# Patient Record
Sex: MALE | Race: WHITE | Employment: OTHER | URBAN - METROPOLITAN AREA
[De-identification: names, ages, dates, MRNs, and addresses within clinical notes are randomized per-mention and may not be internally consistent; named-entity substitution may affect disease eponyms.]

---

## 2019-10-03 ENCOUNTER — HOSPITAL ENCOUNTER (INPATIENT)
Dept: CARDIAC CATH/INVASIVE PROCEDURES | Age: 64
LOS: 2 days | Discharge: HOME | DRG: 282 | End: 2019-10-05
Attending: INTERNAL MEDICINE | Admitting: INTERNAL MEDICINE
Payer: OTHER GOVERNMENT

## 2019-10-03 ENCOUNTER — HOSPITAL ENCOUNTER (EMERGENCY)
Age: 64
Discharge: ANOTHER HOSPITAL | End: 2019-10-03
Attending: EMERGENCY MEDICINE
Payer: OTHER GOVERNMENT

## 2019-10-03 ENCOUNTER — APPOINTMENT (OUTPATIENT)
Dept: GENERAL RADIOLOGY | Age: 64
End: 2019-10-03
Payer: OTHER GOVERNMENT

## 2019-10-03 VITALS
DIASTOLIC BLOOD PRESSURE: 92 MMHG | BODY MASS INDEX: 29.8 KG/M2 | OXYGEN SATURATION: 96 % | WEIGHT: 220 LBS | HEIGHT: 72 IN | TEMPERATURE: 97.9 F | HEART RATE: 87 BPM | RESPIRATION RATE: 13 BRPM | SYSTOLIC BLOOD PRESSURE: 157 MMHG

## 2019-10-03 DIAGNOSIS — I21.11 ST ELEVATION MYOCARDIAL INFARCTION INVOLVING RIGHT CORONARY ARTERY (HCC): Primary | ICD-10-CM

## 2019-10-03 DIAGNOSIS — I21.3 ST ELEVATION MYOCARDIAL INFARCTION (STEMI), UNSPECIFIED ARTERY (HCC): Primary | ICD-10-CM

## 2019-10-03 DIAGNOSIS — I10 ESSENTIAL HYPERTENSION: ICD-10-CM

## 2019-10-03 DIAGNOSIS — E78.5 HYPERLIPIDEMIA, UNSPECIFIED HYPERLIPIDEMIA TYPE: ICD-10-CM

## 2019-10-03 PROBLEM — E11.9 DM (DIABETES MELLITUS) (HCC): Status: ACTIVE | Noted: 2019-10-03

## 2019-10-03 LAB
A/G RATIO: 1.8 (ref 1.1–2.2)
ALBUMIN SERPL-MCNC: 4.6 G/DL (ref 3.4–5)
ALP BLD-CCNC: 51 U/L (ref 40–129)
ALT SERPL-CCNC: 14 U/L (ref 10–40)
ANION GAP SERPL CALCULATED.3IONS-SCNC: 14 MMOL/L (ref 3–16)
APTT: 29.8 SEC (ref 26–36)
AST SERPL-CCNC: 16 U/L (ref 15–37)
BASOPHILS ABSOLUTE: 0 K/UL (ref 0–0.2)
BASOPHILS RELATIVE PERCENT: 0.5 %
BILIRUB SERPL-MCNC: 1.1 MG/DL (ref 0–1)
BUN BLDV-MCNC: 28 MG/DL (ref 7–20)
CALCIUM SERPL-MCNC: 10 MG/DL (ref 8.3–10.6)
CHLORIDE BLD-SCNC: 100 MMOL/L (ref 99–110)
CO2: 24 MMOL/L (ref 21–32)
CREAT SERPL-MCNC: 1.2 MG/DL (ref 0.8–1.3)
EKG ATRIAL RATE: 67 BPM
EKG ATRIAL RATE: 72 BPM
EKG ATRIAL RATE: 79 BPM
EKG DIAGNOSIS: NORMAL
EKG P AXIS: 38 DEGREES
EKG P AXIS: 44 DEGREES
EKG P AXIS: 50 DEGREES
EKG P-R INTERVAL: 134 MS
EKG P-R INTERVAL: 138 MS
EKG P-R INTERVAL: 144 MS
EKG Q-T INTERVAL: 370 MS
EKG Q-T INTERVAL: 378 MS
EKG Q-T INTERVAL: 390 MS
EKG QRS DURATION: 82 MS
EKG QRS DURATION: 84 MS
EKG QRS DURATION: 86 MS
EKG QTC CALCULATION (BAZETT): 399 MS
EKG QTC CALCULATION (BAZETT): 424 MS
EKG QTC CALCULATION (BAZETT): 427 MS
EKG R AXIS: 74 DEGREES
EKG R AXIS: 82 DEGREES
EKG R AXIS: 90 DEGREES
EKG T AXIS: 40 DEGREES
EKG T AXIS: 72 DEGREES
EKG T AXIS: 73 DEGREES
EKG VENTRICULAR RATE: 67 BPM
EKG VENTRICULAR RATE: 72 BPM
EKG VENTRICULAR RATE: 79 BPM
EOSINOPHILS ABSOLUTE: 0.1 K/UL (ref 0–0.6)
EOSINOPHILS RELATIVE PERCENT: 1.7 %
GFR AFRICAN AMERICAN: >60
GFR NON-AFRICAN AMERICAN: >60
GLOBULIN: 2.6 G/DL
GLUCOSE BLD-MCNC: 144 MG/DL (ref 70–99)
GLUCOSE BLD-MCNC: 202 MG/DL (ref 70–99)
GLUCOSE BLD-MCNC: 206 MG/DL (ref 70–99)
HCT VFR BLD CALC: 40.2 % (ref 40.5–52.5)
HEMOGLOBIN: 13.9 G/DL (ref 13.5–17.5)
LEFT VENTRICULAR EJECTION FRACTION HIGH VALUE: 40 %
LV EF: 40 %
LV EF: 48 %
LVEF MODALITY: NORMAL
LVEF MODALITY: NORMAL
LYMPHOCYTES ABSOLUTE: 2.4 K/UL (ref 1–5.1)
LYMPHOCYTES RELATIVE PERCENT: 33.4 %
MCH RBC QN AUTO: 30.4 PG (ref 26–34)
MCHC RBC AUTO-ENTMCNC: 34.5 G/DL (ref 31–36)
MCV RBC AUTO: 88 FL (ref 80–100)
MONOCYTES ABSOLUTE: 0.6 K/UL (ref 0–1.3)
MONOCYTES RELATIVE PERCENT: 9.1 %
NEUTROPHILS ABSOLUTE: 3.9 K/UL (ref 1.7–7.7)
NEUTROPHILS RELATIVE PERCENT: 55.3 %
PDW BLD-RTO: 13.6 % (ref 12.4–15.4)
PERFORMED ON: ABNORMAL
PERFORMED ON: ABNORMAL
PLATELET # BLD: 180 K/UL (ref 135–450)
PMV BLD AUTO: 6.4 FL (ref 5–10.5)
POC ACT LR: 162 SEC
POC ACT LR: 217 SEC
POTASSIUM REFLEX MAGNESIUM: 4.8 MMOL/L (ref 3.5–5.1)
RBC # BLD: 4.57 M/UL (ref 4.2–5.9)
SODIUM BLD-SCNC: 138 MMOL/L (ref 136–145)
TOTAL PROTEIN: 7.2 G/DL (ref 6.4–8.2)
TROPONIN: 0.51 NG/ML
TROPONIN: <0.01 NG/ML
WBC # BLD: 7.1 K/UL (ref 4–11)

## 2019-10-03 PROCEDURE — C1769 GUIDE WIRE: HCPCS

## 2019-10-03 PROCEDURE — 85347 COAGULATION TIME ACTIVATED: CPT

## 2019-10-03 PROCEDURE — B2111ZZ FLUOROSCOPY OF MULTIPLE CORONARY ARTERIES USING LOW OSMOLAR CONTRAST: ICD-10-PCS | Performed by: INTERNAL MEDICINE

## 2019-10-03 PROCEDURE — 93005 ELECTROCARDIOGRAM TRACING: CPT | Performed by: PHYSICIAN ASSISTANT

## 2019-10-03 PROCEDURE — 85025 COMPLETE CBC W/AUTO DIFF WBC: CPT

## 2019-10-03 PROCEDURE — 96374 THER/PROPH/DIAG INJ IV PUSH: CPT

## 2019-10-03 PROCEDURE — 2500000003 HC RX 250 WO HCPCS

## 2019-10-03 PROCEDURE — 93458 L HRT ARTERY/VENTRICLE ANGIO: CPT

## 2019-10-03 PROCEDURE — 84484 ASSAY OF TROPONIN QUANT: CPT

## 2019-10-03 PROCEDURE — 96375 TX/PRO/DX INJ NEW DRUG ADDON: CPT

## 2019-10-03 PROCEDURE — 6360000002 HC RX W HCPCS: Performed by: PHYSICIAN ASSISTANT

## 2019-10-03 PROCEDURE — 93005 ELECTROCARDIOGRAM TRACING: CPT | Performed by: INTERNAL MEDICINE

## 2019-10-03 PROCEDURE — 2000000000 HC ICU R&B

## 2019-10-03 PROCEDURE — 99291 CRITICAL CARE FIRST HOUR: CPT

## 2019-10-03 PROCEDURE — 4A023N7 MEASUREMENT OF CARDIAC SAMPLING AND PRESSURE, LEFT HEART, PERCUTANEOUS APPROACH: ICD-10-PCS | Performed by: INTERNAL MEDICINE

## 2019-10-03 PROCEDURE — 80053 COMPREHEN METABOLIC PANEL: CPT

## 2019-10-03 PROCEDURE — 6370000000 HC RX 637 (ALT 250 FOR IP): Performed by: INTERNAL MEDICINE

## 2019-10-03 PROCEDURE — 6360000004 HC RX CONTRAST MEDICATION

## 2019-10-03 PROCEDURE — C1887 CATHETER, GUIDING: HCPCS

## 2019-10-03 PROCEDURE — 2709999900 HC NON-CHARGEABLE SUPPLY

## 2019-10-03 PROCEDURE — 85730 THROMBOPLASTIN TIME PARTIAL: CPT

## 2019-10-03 PROCEDURE — 6360000002 HC RX W HCPCS

## 2019-10-03 PROCEDURE — 36415 COLL VENOUS BLD VENIPUNCTURE: CPT

## 2019-10-03 PROCEDURE — 6360000002 HC RX W HCPCS: Performed by: EMERGENCY MEDICINE

## 2019-10-03 PROCEDURE — 6370000000 HC RX 637 (ALT 250 FOR IP)

## 2019-10-03 PROCEDURE — 71045 X-RAY EXAM CHEST 1 VIEW: CPT

## 2019-10-03 PROCEDURE — 99291 CRITICAL CARE FIRST HOUR: CPT | Performed by: INTERNAL MEDICINE

## 2019-10-03 PROCEDURE — B2151ZZ FLUOROSCOPY OF LEFT HEART USING LOW OSMOLAR CONTRAST: ICD-10-PCS | Performed by: INTERNAL MEDICINE

## 2019-10-03 PROCEDURE — 93306 TTE W/DOPPLER COMPLETE: CPT

## 2019-10-03 PROCEDURE — 2580000003 HC RX 258: Performed by: INTERNAL MEDICINE

## 2019-10-03 PROCEDURE — 6370000000 HC RX 637 (ALT 250 FOR IP): Performed by: EMERGENCY MEDICINE

## 2019-10-03 PROCEDURE — C1894 INTRO/SHEATH, NON-LASER: HCPCS

## 2019-10-03 RX ORDER — LISINOPRIL AND HYDROCHLOROTHIAZIDE 20; 12.5 MG/1; MG/1
1 TABLET ORAL DAILY
Status: ON HOLD | COMMUNITY
End: 2019-10-05 | Stop reason: HOSPADM

## 2019-10-03 RX ORDER — LISINOPRIL 10 MG/1
5 TABLET ORAL DAILY
Status: DISCONTINUED | OUTPATIENT
Start: 2019-10-03 | End: 2019-10-05 | Stop reason: HOSPADM

## 2019-10-03 RX ORDER — DEXTROSE MONOHYDRATE 25 G/50ML
12.5 INJECTION, SOLUTION INTRAVENOUS PRN
Status: DISCONTINUED | OUTPATIENT
Start: 2019-10-03 | End: 2019-10-05 | Stop reason: HOSPADM

## 2019-10-03 RX ORDER — ASPIRIN 81 MG/1
81 TABLET, CHEWABLE ORAL DAILY
Status: DISCONTINUED | OUTPATIENT
Start: 2019-10-04 | End: 2019-10-05 | Stop reason: HOSPADM

## 2019-10-03 RX ORDER — GLIPIZIDE 10 MG/1
5 TABLET ORAL
COMMUNITY

## 2019-10-03 RX ORDER — ASPIRIN 81 MG/1
81 TABLET, CHEWABLE ORAL DAILY
Status: DISCONTINUED | OUTPATIENT
Start: 2019-10-03 | End: 2019-10-03

## 2019-10-03 RX ORDER — ACETAMINOPHEN 325 MG/1
650 TABLET ORAL EVERY 4 HOURS PRN
Status: DISCONTINUED | OUTPATIENT
Start: 2019-10-03 | End: 2019-10-05 | Stop reason: HOSPADM

## 2019-10-03 RX ORDER — HEPARIN SODIUM 1000 [USP'U]/ML
4000 INJECTION, SOLUTION INTRAVENOUS; SUBCUTANEOUS PRN
Status: DISCONTINUED | OUTPATIENT
Start: 2019-10-03 | End: 2019-10-03 | Stop reason: HOSPADM

## 2019-10-03 RX ORDER — CARVEDILOL 6.25 MG/1
6.25 TABLET ORAL 2 TIMES DAILY WITH MEALS
Status: DISCONTINUED | OUTPATIENT
Start: 2019-10-03 | End: 2019-10-05 | Stop reason: HOSPADM

## 2019-10-03 RX ORDER — HEPARIN SODIUM 1000 [USP'U]/ML
INJECTION, SOLUTION INTRAVENOUS; SUBCUTANEOUS
Status: COMPLETED | OUTPATIENT
Start: 2019-10-03 | End: 2019-10-03

## 2019-10-03 RX ORDER — SIMVASTATIN 40 MG
40 TABLET ORAL NIGHTLY
Status: ON HOLD | COMMUNITY
End: 2019-10-05 | Stop reason: HOSPADM

## 2019-10-03 RX ORDER — SPIRONOLACTONE 25 MG/1
25 TABLET ORAL DAILY
Status: DISCONTINUED | OUTPATIENT
Start: 2019-10-03 | End: 2019-10-05 | Stop reason: HOSPADM

## 2019-10-03 RX ORDER — HEPARIN SODIUM 10000 [USP'U]/100ML
10 INJECTION, SOLUTION INTRAVENOUS CONTINUOUS
Status: DISCONTINUED | OUTPATIENT
Start: 2019-10-03 | End: 2019-10-03 | Stop reason: HOSPADM

## 2019-10-03 RX ORDER — DEXTROSE MONOHYDRATE 50 MG/ML
100 INJECTION, SOLUTION INTRAVENOUS PRN
Status: DISCONTINUED | OUTPATIENT
Start: 2019-10-03 | End: 2019-10-05 | Stop reason: HOSPADM

## 2019-10-03 RX ORDER — HEPARIN SODIUM 1000 [USP'U]/ML
4000 INJECTION, SOLUTION INTRAVENOUS; SUBCUTANEOUS ONCE
Status: COMPLETED | OUTPATIENT
Start: 2019-10-03 | End: 2019-10-03

## 2019-10-03 RX ORDER — NITROGLYCERIN 0.4 MG/1
0.4 TABLET SUBLINGUAL ONCE
Status: COMPLETED | OUTPATIENT
Start: 2019-10-03 | End: 2019-10-03

## 2019-10-03 RX ORDER — ONDANSETRON 2 MG/ML
4 INJECTION INTRAMUSCULAR; INTRAVENOUS EVERY 6 HOURS PRN
Status: DISCONTINUED | OUTPATIENT
Start: 2019-10-03 | End: 2019-10-05 | Stop reason: HOSPADM

## 2019-10-03 RX ORDER — MIDAZOLAM HYDROCHLORIDE 5 MG/ML
INJECTION INTRAMUSCULAR; INTRAVENOUS
Status: COMPLETED | OUTPATIENT
Start: 2019-10-03 | End: 2019-10-03

## 2019-10-03 RX ORDER — FENTANYL CITRATE 50 UG/ML
INJECTION, SOLUTION INTRAMUSCULAR; INTRAVENOUS
Status: COMPLETED | OUTPATIENT
Start: 2019-10-03 | End: 2019-10-03

## 2019-10-03 RX ORDER — NITROGLYCERIN 0.4 MG/1
0.4 TABLET SUBLINGUAL ONCE
Status: DISCONTINUED | OUTPATIENT
Start: 2019-10-03 | End: 2019-10-03

## 2019-10-03 RX ORDER — ASPIRIN 81 MG/1
243 TABLET, CHEWABLE ORAL DAILY
Status: DISCONTINUED | OUTPATIENT
Start: 2019-10-03 | End: 2019-10-03 | Stop reason: HOSPADM

## 2019-10-03 RX ORDER — SODIUM CHLORIDE 0.9 % (FLUSH) 0.9 %
10 SYRINGE (ML) INJECTION PRN
Status: DISCONTINUED | OUTPATIENT
Start: 2019-10-03 | End: 2019-10-05 | Stop reason: HOSPADM

## 2019-10-03 RX ORDER — NITROGLYCERIN 20 MG/100ML
INJECTION INTRAVENOUS CONTINUOUS PRN
Status: COMPLETED | OUTPATIENT
Start: 2019-10-03 | End: 2019-10-03

## 2019-10-03 RX ORDER — ONDANSETRON 2 MG/ML
4 INJECTION INTRAMUSCULAR; INTRAVENOUS EVERY 6 HOURS PRN
Status: DISCONTINUED | OUTPATIENT
Start: 2019-10-03 | End: 2019-10-03 | Stop reason: HOSPADM

## 2019-10-03 RX ORDER — NICOTINE POLACRILEX 4 MG
15 LOZENGE BUCCAL PRN
Status: DISCONTINUED | OUTPATIENT
Start: 2019-10-03 | End: 2019-10-05 | Stop reason: HOSPADM

## 2019-10-03 RX ORDER — TAMSULOSIN HYDROCHLORIDE 0.4 MG/1
0.4 CAPSULE ORAL DAILY
COMMUNITY

## 2019-10-03 RX ORDER — ATORVASTATIN CALCIUM 80 MG/1
80 TABLET, FILM COATED ORAL NIGHTLY
Status: DISCONTINUED | OUTPATIENT
Start: 2019-10-03 | End: 2019-10-05 | Stop reason: HOSPADM

## 2019-10-03 RX ORDER — NITROGLYCERIN 0.4 MG/1
TABLET SUBLINGUAL
Status: COMPLETED | OUTPATIENT
Start: 2019-10-03 | End: 2019-10-03

## 2019-10-03 RX ORDER — ASPIRIN 81 MG/1
324 TABLET, CHEWABLE ORAL ONCE
Status: DISCONTINUED | OUTPATIENT
Start: 2019-10-03 | End: 2019-10-03

## 2019-10-03 RX ORDER — ASPIRIN 81 MG/1
TABLET, CHEWABLE ORAL
Status: COMPLETED
Start: 2019-10-03 | End: 2019-10-03

## 2019-10-03 RX ORDER — SODIUM CHLORIDE 0.9 % (FLUSH) 0.9 %
10 SYRINGE (ML) INJECTION EVERY 12 HOURS SCHEDULED
Status: DISCONTINUED | OUTPATIENT
Start: 2019-10-03 | End: 2019-10-05 | Stop reason: HOSPADM

## 2019-10-03 RX ORDER — HEPARIN SODIUM 1000 [USP'U]/ML
30 INJECTION, SOLUTION INTRAVENOUS; SUBCUTANEOUS PRN
Status: DISCONTINUED | OUTPATIENT
Start: 2019-10-03 | End: 2019-10-03 | Stop reason: HOSPADM

## 2019-10-03 RX ADMIN — CARVEDILOL 6.25 MG: 6.25 TABLET, FILM COATED ORAL at 16:34

## 2019-10-03 RX ADMIN — NITROGLYCERIN 0.4 MG: 0.4 TABLET SUBLINGUAL at 09:56

## 2019-10-03 RX ADMIN — INSULIN LISPRO 1 UNITS: 100 INJECTION, SOLUTION INTRAVENOUS; SUBCUTANEOUS at 20:37

## 2019-10-03 RX ADMIN — SPIRONOLACTONE 25 MG: 25 TABLET ORAL at 12:53

## 2019-10-03 RX ADMIN — ACETAMINOPHEN 650 MG: 325 TABLET ORAL at 20:41

## 2019-10-03 RX ADMIN — ACETAMINOPHEN 650 MG: 325 TABLET ORAL at 16:34

## 2019-10-03 RX ADMIN — NITROGLYCERIN 0.4 MG: 0.4 TABLET SUBLINGUAL at 09:44

## 2019-10-03 RX ADMIN — ASPIRIN 81 MG 243 MG: 81 TABLET ORAL at 09:29

## 2019-10-03 RX ADMIN — MUPIROCIN: 20 OINTMENT TOPICAL at 12:53

## 2019-10-03 RX ADMIN — FENTANYL CITRATE 25 MCG: 50 INJECTION, SOLUTION INTRAMUSCULAR; INTRAVENOUS at 10:34

## 2019-10-03 RX ADMIN — NITROGLYCERIN 0.4 MG: 0.4 TABLET SUBLINGUAL at 11:06

## 2019-10-03 RX ADMIN — MIDAZOLAM HYDROCHLORIDE 2 MG: 5 INJECTION INTRAMUSCULAR; INTRAVENOUS at 10:34

## 2019-10-03 RX ADMIN — ASPIRIN 243 MG: 81 TABLET, CHEWABLE ORAL at 09:29

## 2019-10-03 RX ADMIN — HEPARIN SODIUM AND DEXTROSE 10 ML/HR: 10000; 5 INJECTION INTRAVENOUS at 09:53

## 2019-10-03 RX ADMIN — TICAGRELOR 180 MG: 90 TABLET ORAL at 09:44

## 2019-10-03 RX ADMIN — TICAGRELOR 90 MG: 90 TABLET ORAL at 16:34

## 2019-10-03 RX ADMIN — INSULIN LISPRO 1 UNITS: 100 INJECTION, SOLUTION INTRAVENOUS; SUBCUTANEOUS at 16:34

## 2019-10-03 RX ADMIN — Medication 10 ML: at 12:53

## 2019-10-03 RX ADMIN — ONDANSETRON 4 MG: 2 INJECTION INTRAMUSCULAR; INTRAVENOUS at 09:29

## 2019-10-03 RX ADMIN — HEPARIN SODIUM 3000 UNITS: 1000 INJECTION, SOLUTION INTRAVENOUS; SUBCUTANEOUS at 10:34

## 2019-10-03 RX ADMIN — CARVEDILOL 6.25 MG: 6.25 TABLET, FILM COATED ORAL at 12:53

## 2019-10-03 RX ADMIN — MUPIROCIN: 20 OINTMENT TOPICAL at 20:36

## 2019-10-03 RX ADMIN — ATORVASTATIN CALCIUM 80 MG: 80 TABLET, FILM COATED ORAL at 20:36

## 2019-10-03 RX ADMIN — LISINOPRIL 5 MG: 10 TABLET ORAL at 12:53

## 2019-10-03 RX ADMIN — Medication 10 ML: at 20:36

## 2019-10-03 RX ADMIN — NITROGLYCERIN 20 MCG/MIN: 20 INJECTION INTRAVENOUS at 11:07

## 2019-10-03 RX ADMIN — HEPARIN SODIUM 4000 UNITS: 1000 INJECTION INTRAVENOUS; SUBCUTANEOUS at 09:52

## 2019-10-03 ASSESSMENT — PAIN SCALES - GENERAL
PAINLEVEL_OUTOF10: 2
PAINLEVEL_OUTOF10: 0
PAINLEVEL_OUTOF10: 3
PAINLEVEL_OUTOF10: 0
PAINLEVEL_OUTOF10: 8

## 2019-10-03 ASSESSMENT — PAIN DESCRIPTION - LOCATION
LOCATION: HEAD
LOCATION: HEAD
LOCATION: CHEST

## 2019-10-03 ASSESSMENT — PAIN DESCRIPTION - PAIN TYPE
TYPE: ACUTE PAIN

## 2019-10-03 ASSESSMENT — PAIN DESCRIPTION - FREQUENCY
FREQUENCY: INTERMITTENT
FREQUENCY: INTERMITTENT

## 2019-10-03 ASSESSMENT — ENCOUNTER SYMPTOMS
NAUSEA: 1
RESPIRATORY NEGATIVE: 1
VOMITING: 1

## 2019-10-03 ASSESSMENT — PAIN DESCRIPTION - DESCRIPTORS
DESCRIPTORS: HEADACHE
DESCRIPTORS: HEADACHE

## 2019-10-04 LAB
ANION GAP SERPL CALCULATED.3IONS-SCNC: 12 MMOL/L (ref 3–16)
BUN BLDV-MCNC: 19 MG/DL (ref 7–20)
CALCIUM SERPL-MCNC: 9.7 MG/DL (ref 8.3–10.6)
CHLORIDE BLD-SCNC: 101 MMOL/L (ref 99–110)
CO2: 23 MMOL/L (ref 21–32)
CREAT SERPL-MCNC: 0.9 MG/DL (ref 0.8–1.3)
GFR AFRICAN AMERICAN: >60
GFR NON-AFRICAN AMERICAN: >60
GLUCOSE BLD-MCNC: 138 MG/DL (ref 70–99)
GLUCOSE BLD-MCNC: 155 MG/DL (ref 70–99)
GLUCOSE BLD-MCNC: 168 MG/DL (ref 70–99)
GLUCOSE BLD-MCNC: 168 MG/DL (ref 70–99)
GLUCOSE BLD-MCNC: 252 MG/DL (ref 70–99)
HCT VFR BLD CALC: 39.5 % (ref 40.5–52.5)
HEMOGLOBIN: 14 G/DL (ref 13.5–17.5)
MCH RBC QN AUTO: 31 PG (ref 26–34)
MCHC RBC AUTO-ENTMCNC: 35.6 G/DL (ref 31–36)
MCV RBC AUTO: 87.1 FL (ref 80–100)
PDW BLD-RTO: 13.5 % (ref 12.4–15.4)
PERFORMED ON: ABNORMAL
PLATELET # BLD: 162 K/UL (ref 135–450)
PMV BLD AUTO: 6.7 FL (ref 5–10.5)
POTASSIUM SERPL-SCNC: 4.4 MMOL/L (ref 3.5–5.1)
RBC # BLD: 4.53 M/UL (ref 4.2–5.9)
SODIUM BLD-SCNC: 136 MMOL/L (ref 136–145)
TROPONIN: 0.59 NG/ML
TROPONIN: 0.66 NG/ML
TROPONIN: 0.74 NG/ML
WBC # BLD: 9.2 K/UL (ref 4–11)

## 2019-10-04 PROCEDURE — 6370000000 HC RX 637 (ALT 250 FOR IP): Performed by: INTERNAL MEDICINE

## 2019-10-04 PROCEDURE — 80048 BASIC METABOLIC PNL TOTAL CA: CPT

## 2019-10-04 PROCEDURE — 93005 ELECTROCARDIOGRAM TRACING: CPT | Performed by: INTERNAL MEDICINE

## 2019-10-04 PROCEDURE — 99232 SBSQ HOSP IP/OBS MODERATE 35: CPT | Performed by: INTERNAL MEDICINE

## 2019-10-04 PROCEDURE — 2580000003 HC RX 258: Performed by: INTERNAL MEDICINE

## 2019-10-04 PROCEDURE — 84484 ASSAY OF TROPONIN QUANT: CPT

## 2019-10-04 PROCEDURE — 2060000000 HC ICU INTERMEDIATE R&B

## 2019-10-04 PROCEDURE — 85027 COMPLETE CBC AUTOMATED: CPT

## 2019-10-04 PROCEDURE — 36415 COLL VENOUS BLD VENIPUNCTURE: CPT

## 2019-10-04 RX ADMIN — TICAGRELOR 90 MG: 90 TABLET ORAL at 08:47

## 2019-10-04 RX ADMIN — ATORVASTATIN CALCIUM 80 MG: 80 TABLET, FILM COATED ORAL at 20:12

## 2019-10-04 RX ADMIN — INSULIN LISPRO 1 UNITS: 100 INJECTION, SOLUTION INTRAVENOUS; SUBCUTANEOUS at 16:43

## 2019-10-04 RX ADMIN — TICAGRELOR 90 MG: 90 TABLET ORAL at 20:12

## 2019-10-04 RX ADMIN — ACETAMINOPHEN 650 MG: 325 TABLET ORAL at 11:50

## 2019-10-04 RX ADMIN — MUPIROCIN: 20 OINTMENT TOPICAL at 08:48

## 2019-10-04 RX ADMIN — CARVEDILOL 6.25 MG: 6.25 TABLET, FILM COATED ORAL at 16:43

## 2019-10-04 RX ADMIN — CARVEDILOL 6.25 MG: 6.25 TABLET, FILM COATED ORAL at 08:47

## 2019-10-04 RX ADMIN — ACETAMINOPHEN 650 MG: 325 TABLET ORAL at 20:12

## 2019-10-04 RX ADMIN — Medication 10 ML: at 20:13

## 2019-10-04 RX ADMIN — INSULIN LISPRO 3 UNITS: 100 INJECTION, SOLUTION INTRAVENOUS; SUBCUTANEOUS at 08:48

## 2019-10-04 RX ADMIN — INSULIN LISPRO 1 UNITS: 100 INJECTION, SOLUTION INTRAVENOUS; SUBCUTANEOUS at 20:17

## 2019-10-04 RX ADMIN — ASPIRIN 81 MG 81 MG: 81 TABLET ORAL at 08:47

## 2019-10-04 RX ADMIN — LISINOPRIL 5 MG: 10 TABLET ORAL at 08:47

## 2019-10-04 RX ADMIN — Medication 10 ML: at 08:52

## 2019-10-04 RX ADMIN — SPIRONOLACTONE 25 MG: 25 TABLET ORAL at 08:47

## 2019-10-04 ASSESSMENT — PAIN SCALES - GENERAL
PAINLEVEL_OUTOF10: 3
PAINLEVEL_OUTOF10: 0
PAINLEVEL_OUTOF10: 4
PAINLEVEL_OUTOF10: 0
PAINLEVEL_OUTOF10: 0
PAINLEVEL_OUTOF10: 3
PAINLEVEL_OUTOF10: 0
PAINLEVEL_OUTOF10: 3

## 2019-10-04 ASSESSMENT — PAIN DESCRIPTION - PAIN TYPE
TYPE: ACUTE PAIN
TYPE: ACUTE PAIN

## 2019-10-04 ASSESSMENT — PAIN DESCRIPTION - FREQUENCY
FREQUENCY: INTERMITTENT
FREQUENCY: INTERMITTENT

## 2019-10-04 ASSESSMENT — PAIN DESCRIPTION - DESCRIPTORS
DESCRIPTORS: HEADACHE
DESCRIPTORS: HEADACHE

## 2019-10-04 ASSESSMENT — PAIN DESCRIPTION - LOCATION: LOCATION: HEAD

## 2019-10-05 ENCOUNTER — TELEPHONE (OUTPATIENT)
Dept: CARDIOLOGY | Age: 64
End: 2019-10-05

## 2019-10-05 VITALS
DIASTOLIC BLOOD PRESSURE: 78 MMHG | WEIGHT: 218 LBS | OXYGEN SATURATION: 98 % | BODY MASS INDEX: 29.57 KG/M2 | TEMPERATURE: 97.4 F | RESPIRATION RATE: 18 BRPM | SYSTOLIC BLOOD PRESSURE: 121 MMHG | HEART RATE: 64 BPM

## 2019-10-05 LAB
ALBUMIN SERPL-MCNC: 4.4 G/DL (ref 3.4–5)
ANION GAP SERPL CALCULATED.3IONS-SCNC: 11 MMOL/L (ref 3–16)
BUN BLDV-MCNC: 18 MG/DL (ref 7–20)
CALCIUM SERPL-MCNC: 10.1 MG/DL (ref 8.3–10.6)
CHLORIDE BLD-SCNC: 104 MMOL/L (ref 99–110)
CO2: 23 MMOL/L (ref 21–32)
CREAT SERPL-MCNC: 0.9 MG/DL (ref 0.8–1.3)
EKG ATRIAL RATE: 59 BPM
EKG DIAGNOSIS: NORMAL
EKG P AXIS: 33 DEGREES
EKG P-R INTERVAL: 132 MS
EKG Q-T INTERVAL: 416 MS
EKG QRS DURATION: 88 MS
EKG QTC CALCULATION (BAZETT): 411 MS
EKG R AXIS: 43 DEGREES
EKG T AXIS: 9 DEGREES
EKG VENTRICULAR RATE: 59 BPM
GFR AFRICAN AMERICAN: >60
GFR NON-AFRICAN AMERICAN: >60
GLUCOSE BLD-MCNC: 172 MG/DL (ref 70–99)
GLUCOSE BLD-MCNC: 184 MG/DL (ref 70–99)
HCT VFR BLD CALC: 42.8 % (ref 40.5–52.5)
HEMOGLOBIN: 15.1 G/DL (ref 13.5–17.5)
MCH RBC QN AUTO: 30.9 PG (ref 26–34)
MCHC RBC AUTO-ENTMCNC: 35.4 G/DL (ref 31–36)
MCV RBC AUTO: 87.3 FL (ref 80–100)
PDW BLD-RTO: 13.6 % (ref 12.4–15.4)
PERFORMED ON: ABNORMAL
PHOSPHORUS: 3.4 MG/DL (ref 2.5–4.9)
PLATELET # BLD: 176 K/UL (ref 135–450)
PMV BLD AUTO: 6.7 FL (ref 5–10.5)
POTASSIUM SERPL-SCNC: 4.6 MMOL/L (ref 3.5–5.1)
RBC # BLD: 4.9 M/UL (ref 4.2–5.9)
SODIUM BLD-SCNC: 138 MMOL/L (ref 136–145)
WBC # BLD: 9.8 K/UL (ref 4–11)

## 2019-10-05 PROCEDURE — 6370000000 HC RX 637 (ALT 250 FOR IP): Performed by: INTERNAL MEDICINE

## 2019-10-05 PROCEDURE — 80069 RENAL FUNCTION PANEL: CPT

## 2019-10-05 PROCEDURE — 36415 COLL VENOUS BLD VENIPUNCTURE: CPT

## 2019-10-05 PROCEDURE — 99233 SBSQ HOSP IP/OBS HIGH 50: CPT | Performed by: NURSE PRACTITIONER

## 2019-10-05 PROCEDURE — 2580000003 HC RX 258: Performed by: INTERNAL MEDICINE

## 2019-10-05 PROCEDURE — 93010 ELECTROCARDIOGRAM REPORT: CPT | Performed by: INTERNAL MEDICINE

## 2019-10-05 PROCEDURE — 85027 COMPLETE CBC AUTOMATED: CPT

## 2019-10-05 RX ORDER — ASPIRIN 81 MG/1
81 TABLET, CHEWABLE ORAL DAILY
Qty: 30 TABLET | Refills: 3 | Status: SHIPPED | OUTPATIENT
Start: 2019-10-06

## 2019-10-05 RX ORDER — SPIRONOLACTONE 25 MG/1
25 TABLET ORAL DAILY
Qty: 30 TABLET | Refills: 3 | Status: SHIPPED | OUTPATIENT
Start: 2019-10-06 | End: 2019-10-28 | Stop reason: SINTOL

## 2019-10-05 RX ORDER — CARVEDILOL 6.25 MG/1
6.25 TABLET ORAL 2 TIMES DAILY WITH MEALS
Qty: 60 TABLET | Refills: 3 | Status: SHIPPED | OUTPATIENT
Start: 2019-10-05

## 2019-10-05 RX ORDER — ATORVASTATIN CALCIUM 80 MG/1
80 TABLET, FILM COATED ORAL NIGHTLY
Qty: 30 TABLET | Refills: 3 | Status: SHIPPED | OUTPATIENT
Start: 2019-10-05

## 2019-10-05 RX ORDER — LISINOPRIL 5 MG/1
5 TABLET ORAL DAILY
Qty: 30 TABLET | Refills: 3 | Status: SHIPPED | OUTPATIENT
Start: 2019-10-06

## 2019-10-05 RX ADMIN — INSULIN LISPRO 1 UNITS: 100 INJECTION, SOLUTION INTRAVENOUS; SUBCUTANEOUS at 07:53

## 2019-10-05 RX ADMIN — CARVEDILOL 6.25 MG: 6.25 TABLET, FILM COATED ORAL at 07:52

## 2019-10-05 RX ADMIN — ASPIRIN 81 MG 81 MG: 81 TABLET ORAL at 08:59

## 2019-10-05 RX ADMIN — TICAGRELOR 90 MG: 90 TABLET ORAL at 08:59

## 2019-10-05 RX ADMIN — LISINOPRIL 5 MG: 10 TABLET ORAL at 08:59

## 2019-10-05 RX ADMIN — SPIRONOLACTONE 25 MG: 25 TABLET ORAL at 08:59

## 2019-10-05 RX ADMIN — Medication 10 ML: at 08:59

## 2019-10-16 ENCOUNTER — HOSPITAL ENCOUNTER (INPATIENT)
Age: 64
LOS: 2 days | Discharge: HOME | DRG: 282 | End: 2019-10-18
Attending: EMERGENCY MEDICINE | Admitting: INTERNAL MEDICINE
Payer: MEDICARE

## 2019-10-16 ENCOUNTER — APPOINTMENT (OUTPATIENT)
Dept: GENERAL RADIOLOGY | Age: 64
DRG: 282 | End: 2019-10-16
Payer: MEDICARE

## 2019-10-16 ENCOUNTER — HOSPITAL ENCOUNTER (OUTPATIENT)
Dept: CARDIAC CATH/INVASIVE PROCEDURES | Age: 64
Discharge: HOME | DRG: 282 | End: 2019-10-16
Payer: MEDICARE

## 2019-10-16 DIAGNOSIS — I24.9 ACS (ACUTE CORONARY SYNDROME) (HCC): Primary | ICD-10-CM

## 2019-10-16 PROBLEM — I20.0 UNSTABLE ANGINA (HCC): Status: ACTIVE | Noted: 2019-10-16

## 2019-10-16 LAB
A/G RATIO: 1.8 (ref 1.1–2.2)
ALBUMIN SERPL-MCNC: 4.3 G/DL (ref 3.4–5)
ALP BLD-CCNC: 61 U/L (ref 40–129)
ALT SERPL-CCNC: 15 U/L (ref 10–40)
ANION GAP SERPL CALCULATED.3IONS-SCNC: 13 MMOL/L (ref 3–16)
AST SERPL-CCNC: 16 U/L (ref 15–37)
BASOPHILS ABSOLUTE: 0.1 K/UL (ref 0–0.2)
BASOPHILS RELATIVE PERCENT: 0.5 %
BILIRUB SERPL-MCNC: 0.9 MG/DL (ref 0–1)
BUN BLDV-MCNC: 23 MG/DL (ref 7–20)
CALCIUM SERPL-MCNC: 9.7 MG/DL (ref 8.3–10.6)
CHLORIDE BLD-SCNC: 101 MMOL/L (ref 99–110)
CO2: 20 MMOL/L (ref 21–32)
CREAT SERPL-MCNC: 1 MG/DL (ref 0.8–1.3)
EKG ATRIAL RATE: 63 BPM
EKG ATRIAL RATE: 83 BPM
EKG ATRIAL RATE: 84 BPM
EKG DIAGNOSIS: NORMAL
EKG P AXIS: 25 DEGREES
EKG P AXIS: 29 DEGREES
EKG P AXIS: 58 DEGREES
EKG P-R INTERVAL: 136 MS
EKG P-R INTERVAL: 138 MS
EKG P-R INTERVAL: 150 MS
EKG Q-T INTERVAL: 354 MS
EKG Q-T INTERVAL: 370 MS
EKG Q-T INTERVAL: 388 MS
EKG QRS DURATION: 84 MS
EKG QRS DURATION: 84 MS
EKG QRS DURATION: 88 MS
EKG QTC CALCULATION (BAZETT): 397 MS
EKG QTC CALCULATION (BAZETT): 415 MS
EKG QTC CALCULATION (BAZETT): 437 MS
EKG R AXIS: 48 DEGREES
EKG R AXIS: 49 DEGREES
EKG R AXIS: 66 DEGREES
EKG T AXIS: 16 DEGREES
EKG T AXIS: 2 DEGREES
EKG T AXIS: 38 DEGREES
EKG VENTRICULAR RATE: 63 BPM
EKG VENTRICULAR RATE: 83 BPM
EKG VENTRICULAR RATE: 84 BPM
EOSINOPHILS ABSOLUTE: 0.1 K/UL (ref 0–0.6)
EOSINOPHILS RELATIVE PERCENT: 1.1 %
GFR AFRICAN AMERICAN: >60
GFR NON-AFRICAN AMERICAN: >60
GLOBULIN: 2.4 G/DL
GLUCOSE BLD-MCNC: 181 MG/DL (ref 70–99)
GLUCOSE BLD-MCNC: 185 MG/DL (ref 70–99)
GLUCOSE BLD-MCNC: 195 MG/DL (ref 70–99)
HCT VFR BLD CALC: 38.3 % (ref 40.5–52.5)
HEMOGLOBIN: 13.4 G/DL (ref 13.5–17.5)
LV EF: 43 %
LVEF MODALITY: NORMAL
LYMPHOCYTES ABSOLUTE: 1.9 K/UL (ref 1–5.1)
LYMPHOCYTES RELATIVE PERCENT: 17.9 %
MCH RBC QN AUTO: 30.8 PG (ref 26–34)
MCHC RBC AUTO-ENTMCNC: 34.9 G/DL (ref 31–36)
MCV RBC AUTO: 88.2 FL (ref 80–100)
MONOCYTES ABSOLUTE: 0.5 K/UL (ref 0–1.3)
MONOCYTES RELATIVE PERCENT: 4.6 %
NEUTROPHILS ABSOLUTE: 8.1 K/UL (ref 1.7–7.7)
NEUTROPHILS RELATIVE PERCENT: 75.9 %
PDW BLD-RTO: 13.7 % (ref 12.4–15.4)
PERFORMED ON: ABNORMAL
PERFORMED ON: ABNORMAL
PLATELET # BLD: 164 K/UL (ref 135–450)
PMV BLD AUTO: 6.6 FL (ref 5–10.5)
POC ACT LR: 199 SEC
POC ACT LR: 229 SEC
POC ACT LR: 246 SEC
POC ACT LR: 288 SEC
POC ACT LR: 330 SEC
POTASSIUM SERPL-SCNC: 4.6 MMOL/L (ref 3.5–5.1)
RBC # BLD: 4.34 M/UL (ref 4.2–5.9)
SODIUM BLD-SCNC: 134 MMOL/L (ref 136–145)
TOTAL PROTEIN: 6.7 G/DL (ref 6.4–8.2)
TROPONIN: 0.02 NG/ML
TROPONIN: 0.05 NG/ML
TROPONIN: <0.01 NG/ML
WBC # BLD: 10.6 K/UL (ref 4–11)

## 2019-10-16 PROCEDURE — 99152 MOD SED SAME PHYS/QHP 5/>YRS: CPT

## 2019-10-16 PROCEDURE — 6360000002 HC RX W HCPCS: Performed by: EMERGENCY MEDICINE

## 2019-10-16 PROCEDURE — 83036 HEMOGLOBIN GLYCOSYLATED A1C: CPT

## 2019-10-16 PROCEDURE — 36415 COLL VENOUS BLD VENIPUNCTURE: CPT

## 2019-10-16 PROCEDURE — 93005 ELECTROCARDIOGRAM TRACING: CPT | Performed by: EMERGENCY MEDICINE

## 2019-10-16 PROCEDURE — 2709999900 HC NON-CHARGEABLE SUPPLY

## 2019-10-16 PROCEDURE — 99152 MOD SED SAME PHYS/QHP 5/>YRS: CPT | Performed by: INTERNAL MEDICINE

## 2019-10-16 PROCEDURE — 93005 ELECTROCARDIOGRAM TRACING: CPT | Performed by: INTERNAL MEDICINE

## 2019-10-16 PROCEDURE — 99291 CRITICAL CARE FIRST HOUR: CPT

## 2019-10-16 PROCEDURE — 4A023N7 MEASUREMENT OF CARDIAC SAMPLING AND PRESSURE, LEFT HEART, PERCUTANEOUS APPROACH: ICD-10-PCS | Performed by: INTERNAL MEDICINE

## 2019-10-16 PROCEDURE — C1887 CATHETER, GUIDING: HCPCS

## 2019-10-16 PROCEDURE — 96374 THER/PROPH/DIAG INJ IV PUSH: CPT

## 2019-10-16 PROCEDURE — B2151ZZ FLUOROSCOPY OF LEFT HEART USING LOW OSMOLAR CONTRAST: ICD-10-PCS | Performed by: INTERNAL MEDICINE

## 2019-10-16 PROCEDURE — 85025 COMPLETE CBC W/AUTO DIFF WBC: CPT

## 2019-10-16 PROCEDURE — 2580000003 HC RX 258: Performed by: INTERNAL MEDICINE

## 2019-10-16 PROCEDURE — 93458 L HRT ARTERY/VENTRICLE ANGIO: CPT | Performed by: INTERNAL MEDICINE

## 2019-10-16 PROCEDURE — 85347 COAGULATION TIME ACTIVATED: CPT

## 2019-10-16 PROCEDURE — 6360000002 HC RX W HCPCS

## 2019-10-16 PROCEDURE — 84484 ASSAY OF TROPONIN QUANT: CPT

## 2019-10-16 PROCEDURE — C1769 GUIDE WIRE: HCPCS

## 2019-10-16 PROCEDURE — B2111ZZ FLUOROSCOPY OF MULTIPLE CORONARY ARTERIES USING LOW OSMOLAR CONTRAST: ICD-10-PCS | Performed by: INTERNAL MEDICINE

## 2019-10-16 PROCEDURE — 6370000000 HC RX 637 (ALT 250 FOR IP): Performed by: NURSE PRACTITIONER

## 2019-10-16 PROCEDURE — 80053 COMPREHEN METABOLIC PANEL: CPT

## 2019-10-16 PROCEDURE — 2060000000 HC ICU INTERMEDIATE R&B

## 2019-10-16 PROCEDURE — 93458 L HRT ARTERY/VENTRICLE ANGIO: CPT

## 2019-10-16 PROCEDURE — 6370000000 HC RX 637 (ALT 250 FOR IP): Performed by: INTERNAL MEDICINE

## 2019-10-16 PROCEDURE — C1894 INTRO/SHEATH, NON-LASER: HCPCS

## 2019-10-16 PROCEDURE — 99223 1ST HOSP IP/OBS HIGH 75: CPT | Performed by: INTERNAL MEDICINE

## 2019-10-16 PROCEDURE — 71045 X-RAY EXAM CHEST 1 VIEW: CPT

## 2019-10-16 PROCEDURE — 93308 TTE F-UP OR LMTD: CPT

## 2019-10-16 PROCEDURE — 99153 MOD SED SAME PHYS/QHP EA: CPT

## 2019-10-16 PROCEDURE — 96375 TX/PRO/DX INJ NEW DRUG ADDON: CPT

## 2019-10-16 PROCEDURE — 2500000003 HC RX 250 WO HCPCS

## 2019-10-16 PROCEDURE — 6360000004 HC RX CONTRAST MEDICATION

## 2019-10-16 PROCEDURE — 2580000003 HC RX 258

## 2019-10-16 PROCEDURE — 6370000000 HC RX 637 (ALT 250 FOR IP): Performed by: EMERGENCY MEDICINE

## 2019-10-16 RX ORDER — SPIRONOLACTONE 25 MG/1
25 TABLET ORAL DAILY
Status: DISCONTINUED | OUTPATIENT
Start: 2019-10-16 | End: 2019-10-18 | Stop reason: HOSPADM

## 2019-10-16 RX ORDER — ONDANSETRON 2 MG/ML
4 INJECTION INTRAMUSCULAR; INTRAVENOUS ONCE
Status: COMPLETED | OUTPATIENT
Start: 2019-10-16 | End: 2019-10-16

## 2019-10-16 RX ORDER — MIDAZOLAM HYDROCHLORIDE 5 MG/ML
INJECTION INTRAMUSCULAR; INTRAVENOUS
Status: COMPLETED | OUTPATIENT
Start: 2019-10-16 | End: 2019-10-16

## 2019-10-16 RX ORDER — TAMSULOSIN HYDROCHLORIDE 0.4 MG/1
0.4 CAPSULE ORAL DAILY
Status: DISCONTINUED | OUTPATIENT
Start: 2019-10-17 | End: 2019-10-18 | Stop reason: HOSPADM

## 2019-10-16 RX ORDER — FENTANYL CITRATE 50 UG/ML
INJECTION, SOLUTION INTRAMUSCULAR; INTRAVENOUS
Status: COMPLETED | OUTPATIENT
Start: 2019-10-16 | End: 2019-10-16

## 2019-10-16 RX ORDER — HEPARIN SODIUM 1000 [USP'U]/ML
INJECTION, SOLUTION INTRAVENOUS; SUBCUTANEOUS
Status: COMPLETED | OUTPATIENT
Start: 2019-10-16 | End: 2019-10-16

## 2019-10-16 RX ORDER — HEPARIN SODIUM 1000 [USP'U]/ML
4000 INJECTION, SOLUTION INTRAVENOUS; SUBCUTANEOUS PRN
Status: DISCONTINUED | OUTPATIENT
Start: 2019-10-16 | End: 2019-10-16

## 2019-10-16 RX ORDER — DEXTROSE MONOHYDRATE 50 MG/ML
100 INJECTION, SOLUTION INTRAVENOUS PRN
Status: DISCONTINUED | OUTPATIENT
Start: 2019-10-16 | End: 2019-10-18 | Stop reason: HOSPADM

## 2019-10-16 RX ORDER — HEPARIN SODIUM 10000 [USP'U]/100ML
1000 INJECTION, SOLUTION INTRAVENOUS CONTINUOUS
Status: DISCONTINUED | OUTPATIENT
Start: 2019-10-16 | End: 2019-10-16

## 2019-10-16 RX ORDER — DEXTROSE MONOHYDRATE 25 G/50ML
12.5 INJECTION, SOLUTION INTRAVENOUS PRN
Status: DISCONTINUED | OUTPATIENT
Start: 2019-10-16 | End: 2019-10-18 | Stop reason: HOSPADM

## 2019-10-16 RX ORDER — ACETAMINOPHEN 325 MG/1
650 TABLET ORAL EVERY 4 HOURS PRN
Status: DISCONTINUED | OUTPATIENT
Start: 2019-10-16 | End: 2019-10-18 | Stop reason: HOSPADM

## 2019-10-16 RX ORDER — ASPIRIN 81 MG/1
81 TABLET, CHEWABLE ORAL DAILY
Status: DISCONTINUED | OUTPATIENT
Start: 2019-10-17 | End: 2019-10-18 | Stop reason: HOSPADM

## 2019-10-16 RX ORDER — CARVEDILOL 6.25 MG/1
6.25 TABLET ORAL 2 TIMES DAILY WITH MEALS
Status: DISCONTINUED | OUTPATIENT
Start: 2019-10-16 | End: 2019-10-18 | Stop reason: HOSPADM

## 2019-10-16 RX ORDER — GABAPENTIN 600 MG/1
600 TABLET ORAL 2 TIMES DAILY
COMMUNITY

## 2019-10-16 RX ORDER — NICOTINE POLACRILEX 4 MG
15 LOZENGE BUCCAL PRN
Status: DISCONTINUED | OUTPATIENT
Start: 2019-10-16 | End: 2019-10-18 | Stop reason: HOSPADM

## 2019-10-16 RX ORDER — SODIUM CHLORIDE 0.9 % (FLUSH) 0.9 %
10 SYRINGE (ML) INJECTION EVERY 12 HOURS SCHEDULED
Status: DISCONTINUED | OUTPATIENT
Start: 2019-10-16 | End: 2019-10-18 | Stop reason: HOSPADM

## 2019-10-16 RX ORDER — HEPARIN SODIUM 1000 [USP'U]/ML
2000 INJECTION, SOLUTION INTRAVENOUS; SUBCUTANEOUS PRN
Status: DISCONTINUED | OUTPATIENT
Start: 2019-10-16 | End: 2019-10-16

## 2019-10-16 RX ORDER — SODIUM CHLORIDE 0.9 % (FLUSH) 0.9 %
10 SYRINGE (ML) INJECTION PRN
Status: DISCONTINUED | OUTPATIENT
Start: 2019-10-16 | End: 2019-10-18 | Stop reason: HOSPADM

## 2019-10-16 RX ORDER — BACLOFEN 10 MG/1
10 TABLET ORAL 3 TIMES DAILY PRN
Status: ON HOLD | COMMUNITY
End: 2022-07-24

## 2019-10-16 RX ORDER — NITROGLYCERIN 20 MG/100ML
5 INJECTION INTRAVENOUS CONTINUOUS
Status: DISCONTINUED | OUTPATIENT
Start: 2019-10-16 | End: 2019-10-16

## 2019-10-16 RX ORDER — ONDANSETRON 2 MG/ML
4 INJECTION INTRAMUSCULAR; INTRAVENOUS EVERY 6 HOURS PRN
Status: DISCONTINUED | OUTPATIENT
Start: 2019-10-16 | End: 2019-10-18 | Stop reason: HOSPADM

## 2019-10-16 RX ORDER — LISINOPRIL 5 MG/1
5 TABLET ORAL DAILY
Status: DISCONTINUED | OUTPATIENT
Start: 2019-10-17 | End: 2019-10-18 | Stop reason: HOSPADM

## 2019-10-16 RX ORDER — SODIUM CHLORIDE 9 MG/ML
INJECTION, SOLUTION INTRAVENOUS CONTINUOUS
Status: DISCONTINUED | OUTPATIENT
Start: 2019-10-16 | End: 2019-10-17

## 2019-10-16 RX ORDER — ALOGLIPTIN 25 MG/1
25 TABLET, FILM COATED ORAL DAILY
COMMUNITY

## 2019-10-16 RX ORDER — GABAPENTIN 300 MG/1
600 CAPSULE ORAL 2 TIMES DAILY
Status: DISCONTINUED | OUTPATIENT
Start: 2019-10-16 | End: 2019-10-18 | Stop reason: HOSPADM

## 2019-10-16 RX ORDER — HEPARIN SODIUM 1000 [USP'U]/ML
4000 INJECTION, SOLUTION INTRAVENOUS; SUBCUTANEOUS ONCE
Status: DISCONTINUED | OUTPATIENT
Start: 2019-10-16 | End: 2019-10-16

## 2019-10-16 RX ORDER — MORPHINE SULFATE 4 MG/ML
4 INJECTION, SOLUTION INTRAMUSCULAR; INTRAVENOUS ONCE
Status: COMPLETED | OUTPATIENT
Start: 2019-10-16 | End: 2019-10-16

## 2019-10-16 RX ORDER — ATORVASTATIN CALCIUM 80 MG/1
80 TABLET, FILM COATED ORAL NIGHTLY
Status: DISCONTINUED | OUTPATIENT
Start: 2019-10-16 | End: 2019-10-18 | Stop reason: HOSPADM

## 2019-10-16 RX ORDER — BACLOFEN 10 MG/1
10 TABLET ORAL 3 TIMES DAILY PRN
Status: DISCONTINUED | OUTPATIENT
Start: 2019-10-16 | End: 2019-10-18 | Stop reason: HOSPADM

## 2019-10-16 RX ADMIN — MORPHINE SULFATE 4 MG: 4 INJECTION, SOLUTION INTRAMUSCULAR; INTRAVENOUS at 07:26

## 2019-10-16 RX ADMIN — INSULIN LISPRO 1 UNITS: 100 INJECTION, SOLUTION INTRAVENOUS; SUBCUTANEOUS at 18:26

## 2019-10-16 RX ADMIN — INSULIN LISPRO 1 UNITS: 100 INJECTION, SOLUTION INTRAVENOUS; SUBCUTANEOUS at 20:56

## 2019-10-16 RX ADMIN — SPIRONOLACTONE 25 MG: 25 TABLET ORAL at 13:00

## 2019-10-16 RX ADMIN — HEPARIN SODIUM 5000 UNITS: 1000 INJECTION, SOLUTION INTRAVENOUS; SUBCUTANEOUS at 10:04

## 2019-10-16 RX ADMIN — FENTANYL CITRATE 50 MCG: 50 INJECTION, SOLUTION INTRAMUSCULAR; INTRAVENOUS at 09:48

## 2019-10-16 RX ADMIN — ATORVASTATIN CALCIUM 80 MG: 80 TABLET, FILM COATED ORAL at 21:16

## 2019-10-16 RX ADMIN — ACETAMINOPHEN 650 MG: 325 TABLET ORAL at 21:14

## 2019-10-16 RX ADMIN — HEPARIN SODIUM 2000 UNITS: 1000 INJECTION, SOLUTION INTRAVENOUS; SUBCUTANEOUS at 10:17

## 2019-10-16 RX ADMIN — FENTANYL CITRATE 25 MCG: 50 INJECTION, SOLUTION INTRAMUSCULAR; INTRAVENOUS at 10:54

## 2019-10-16 RX ADMIN — NITROGLYCERIN 0.5 INCH: 20 OINTMENT TOPICAL at 07:02

## 2019-10-16 RX ADMIN — GABAPENTIN 600 MG: 300 CAPSULE ORAL at 21:16

## 2019-10-16 RX ADMIN — MIDAZOLAM HYDROCHLORIDE 2 MG: 5 INJECTION INTRAMUSCULAR; INTRAVENOUS at 09:56

## 2019-10-16 RX ADMIN — MIDAZOLAM HYDROCHLORIDE 1 MG: 5 INJECTION INTRAMUSCULAR; INTRAVENOUS at 10:54

## 2019-10-16 RX ADMIN — ONDANSETRON 4 MG: 2 INJECTION INTRAMUSCULAR; INTRAVENOUS at 07:26

## 2019-10-16 RX ADMIN — SODIUM CHLORIDE: 9 INJECTION, SOLUTION INTRAVENOUS at 12:50

## 2019-10-16 RX ADMIN — TICAGRELOR 90 MG: 90 TABLET ORAL at 21:17

## 2019-10-16 RX ADMIN — MIDAZOLAM HYDROCHLORIDE 2 MG: 5 INJECTION INTRAMUSCULAR; INTRAVENOUS at 10:18

## 2019-10-16 RX ADMIN — CARVEDILOL 6.25 MG: 6.25 TABLET, FILM COATED ORAL at 17:56

## 2019-10-16 RX ADMIN — MIDAZOLAM HYDROCHLORIDE 1 MG: 5 INJECTION INTRAMUSCULAR; INTRAVENOUS at 11:20

## 2019-10-16 RX ADMIN — HEPARIN SODIUM 4000 UNITS: 1000 INJECTION, SOLUTION INTRAVENOUS; SUBCUTANEOUS at 10:16

## 2019-10-16 RX ADMIN — FENTANYL CITRATE 25 MCG: 50 INJECTION, SOLUTION INTRAMUSCULAR; INTRAVENOUS at 11:20

## 2019-10-16 RX ADMIN — Medication 10 ML: at 21:17

## 2019-10-16 ASSESSMENT — PAIN DESCRIPTION - ORIENTATION
ORIENTATION: MID
ORIENTATION: MID
ORIENTATION: LEFT
ORIENTATION: LEFT

## 2019-10-16 ASSESSMENT — PAIN DESCRIPTION - PAIN TYPE
TYPE: ACUTE PAIN

## 2019-10-16 ASSESSMENT — PAIN SCALES - GENERAL
PAINLEVEL_OUTOF10: 1
PAINLEVEL_OUTOF10: 5
PAINLEVEL_OUTOF10: 0
PAINLEVEL_OUTOF10: 4
PAINLEVEL_OUTOF10: 2
PAINLEVEL_OUTOF10: 3
PAINLEVEL_OUTOF10: 0
PAINLEVEL_OUTOF10: 0
PAINLEVEL_OUTOF10: 10
PAINLEVEL_OUTOF10: 0
PAINLEVEL_OUTOF10: 0

## 2019-10-16 ASSESSMENT — PAIN DESCRIPTION - LOCATION
LOCATION: CHEST

## 2019-10-16 ASSESSMENT — PAIN DESCRIPTION - FREQUENCY
FREQUENCY: CONTINUOUS
FREQUENCY: CONTINUOUS

## 2019-10-16 ASSESSMENT — PAIN DESCRIPTION - PROGRESSION: CLINICAL_PROGRESSION: GRADUALLY IMPROVING

## 2019-10-17 LAB
ANION GAP SERPL CALCULATED.3IONS-SCNC: 11 MMOL/L (ref 3–16)
BUN BLDV-MCNC: 16 MG/DL (ref 7–20)
CALCIUM SERPL-MCNC: 9.1 MG/DL (ref 8.3–10.6)
CHLORIDE BLD-SCNC: 103 MMOL/L (ref 99–110)
CO2: 21 MMOL/L (ref 21–32)
CREAT SERPL-MCNC: 0.8 MG/DL (ref 0.8–1.3)
ESTIMATED AVERAGE GLUCOSE: 142.7 MG/DL
GFR AFRICAN AMERICAN: >60
GFR NON-AFRICAN AMERICAN: >60
GLUCOSE BLD-MCNC: 162 MG/DL (ref 70–99)
GLUCOSE BLD-MCNC: 164 MG/DL (ref 70–99)
GLUCOSE BLD-MCNC: 238 MG/DL (ref 70–99)
GLUCOSE BLD-MCNC: 242 MG/DL (ref 70–99)
GLUCOSE BLD-MCNC: 86 MG/DL (ref 70–99)
HBA1C MFR BLD: 6.6 %
HCT VFR BLD CALC: 37.3 % (ref 40.5–52.5)
HEMOGLOBIN: 13.4 G/DL (ref 13.5–17.5)
MCH RBC QN AUTO: 31 PG (ref 26–34)
MCHC RBC AUTO-ENTMCNC: 35.8 G/DL (ref 31–36)
MCV RBC AUTO: 86.6 FL (ref 80–100)
PDW BLD-RTO: 13.7 % (ref 12.4–15.4)
PERFORMED ON: ABNORMAL
PERFORMED ON: NORMAL
PLATELET # BLD: 161 K/UL (ref 135–450)
PMV BLD AUTO: 6.5 FL (ref 5–10.5)
POC ACT LR: >400 SEC
POC ACT LR: >400 SEC
POTASSIUM REFLEX MAGNESIUM: 4.6 MMOL/L (ref 3.5–5.1)
RBC # BLD: 4.31 M/UL (ref 4.2–5.9)
SODIUM BLD-SCNC: 135 MMOL/L (ref 136–145)
WBC # BLD: 9.8 K/UL (ref 4–11)

## 2019-10-17 PROCEDURE — 6360000002 HC RX W HCPCS: Performed by: INTERNAL MEDICINE

## 2019-10-17 PROCEDURE — 80048 BASIC METABOLIC PNL TOTAL CA: CPT

## 2019-10-17 PROCEDURE — 2580000003 HC RX 258: Performed by: INTERNAL MEDICINE

## 2019-10-17 PROCEDURE — 6370000000 HC RX 637 (ALT 250 FOR IP): Performed by: NURSE PRACTITIONER

## 2019-10-17 PROCEDURE — 85027 COMPLETE CBC AUTOMATED: CPT

## 2019-10-17 PROCEDURE — 2060000000 HC ICU INTERMEDIATE R&B

## 2019-10-17 PROCEDURE — 36415 COLL VENOUS BLD VENIPUNCTURE: CPT

## 2019-10-17 PROCEDURE — 6370000000 HC RX 637 (ALT 250 FOR IP): Performed by: INTERNAL MEDICINE

## 2019-10-17 PROCEDURE — 93005 ELECTROCARDIOGRAM TRACING: CPT | Performed by: INTERNAL MEDICINE

## 2019-10-17 PROCEDURE — 99233 SBSQ HOSP IP/OBS HIGH 50: CPT | Performed by: INTERNAL MEDICINE

## 2019-10-17 RX ORDER — ISOSORBIDE MONONITRATE 30 MG/1
30 TABLET, EXTENDED RELEASE ORAL DAILY
Status: DISCONTINUED | OUTPATIENT
Start: 2019-10-17 | End: 2019-10-18 | Stop reason: HOSPADM

## 2019-10-17 RX ORDER — GLIPIZIDE 5 MG/1
5 TABLET ORAL
Status: DISCONTINUED | OUTPATIENT
Start: 2019-10-17 | End: 2019-10-18 | Stop reason: HOSPADM

## 2019-10-17 RX ADMIN — LISINOPRIL 5 MG: 5 TABLET ORAL at 09:54

## 2019-10-17 RX ADMIN — ACETAMINOPHEN 650 MG: 325 TABLET ORAL at 15:53

## 2019-10-17 RX ADMIN — ACETAMINOPHEN 650 MG: 325 TABLET ORAL at 21:27

## 2019-10-17 RX ADMIN — ACETAMINOPHEN 650 MG: 325 TABLET ORAL at 05:19

## 2019-10-17 RX ADMIN — TAMSULOSIN HYDROCHLORIDE 0.4 MG: 0.4 CAPSULE ORAL at 09:54

## 2019-10-17 RX ADMIN — CARVEDILOL 6.25 MG: 6.25 TABLET, FILM COATED ORAL at 09:54

## 2019-10-17 RX ADMIN — SPIRONOLACTONE 25 MG: 25 TABLET ORAL at 09:54

## 2019-10-17 RX ADMIN — Medication 10 ML: at 21:18

## 2019-10-17 RX ADMIN — GABAPENTIN 600 MG: 300 CAPSULE ORAL at 09:54

## 2019-10-17 RX ADMIN — LINAGLIPTIN 5 MG: 5 TABLET, FILM COATED ORAL at 09:54

## 2019-10-17 RX ADMIN — INSULIN LISPRO 2 UNITS: 100 INJECTION, SOLUTION INTRAVENOUS; SUBCUTANEOUS at 21:21

## 2019-10-17 RX ADMIN — INSULIN LISPRO 2 UNITS: 100 INJECTION, SOLUTION INTRAVENOUS; SUBCUTANEOUS at 11:21

## 2019-10-17 RX ADMIN — GLIPIZIDE 5 MG: 5 TABLET ORAL at 12:23

## 2019-10-17 RX ADMIN — TICAGRELOR 90 MG: 90 TABLET ORAL at 09:54

## 2019-10-17 RX ADMIN — ATORVASTATIN CALCIUM 80 MG: 80 TABLET, FILM COATED ORAL at 21:17

## 2019-10-17 RX ADMIN — ENOXAPARIN SODIUM 40 MG: 40 INJECTION SUBCUTANEOUS at 12:23

## 2019-10-17 RX ADMIN — INSULIN LISPRO 1 UNITS: 100 INJECTION, SOLUTION INTRAVENOUS; SUBCUTANEOUS at 09:54

## 2019-10-17 RX ADMIN — Medication 10 ML: at 09:55

## 2019-10-17 RX ADMIN — CARVEDILOL 6.25 MG: 6.25 TABLET, FILM COATED ORAL at 17:16

## 2019-10-17 RX ADMIN — ASPIRIN 81 MG 81 MG: 81 TABLET ORAL at 09:54

## 2019-10-17 RX ADMIN — GABAPENTIN 600 MG: 300 CAPSULE ORAL at 21:17

## 2019-10-17 RX ADMIN — Medication 10 ML: at 10:09

## 2019-10-17 RX ADMIN — ISOSORBIDE MONONITRATE 30 MG: 30 TABLET, EXTENDED RELEASE ORAL at 09:54

## 2019-10-17 RX ADMIN — TICAGRELOR 90 MG: 90 TABLET ORAL at 21:17

## 2019-10-17 ASSESSMENT — PAIN SCALES - GENERAL
PAINLEVEL_OUTOF10: 0
PAINLEVEL_OUTOF10: 2
PAINLEVEL_OUTOF10: 2
PAINLEVEL_OUTOF10: 3
PAINLEVEL_OUTOF10: 0

## 2019-10-18 VITALS
DIASTOLIC BLOOD PRESSURE: 67 MMHG | HEART RATE: 74 BPM | BODY MASS INDEX: 29.3 KG/M2 | HEIGHT: 72 IN | WEIGHT: 216.3 LBS | RESPIRATION RATE: 16 BRPM | OXYGEN SATURATION: 98 % | TEMPERATURE: 98.3 F | SYSTOLIC BLOOD PRESSURE: 111 MMHG

## 2019-10-18 PROBLEM — I21.4 NSTEMI (NON-ST ELEVATED MYOCARDIAL INFARCTION) (HCC): Status: ACTIVE | Noted: 2019-10-03

## 2019-10-18 LAB
ANION GAP SERPL CALCULATED.3IONS-SCNC: 11 MMOL/L (ref 3–16)
BUN BLDV-MCNC: 20 MG/DL (ref 7–20)
CALCIUM SERPL-MCNC: 9.5 MG/DL (ref 8.3–10.6)
CHLORIDE BLD-SCNC: 102 MMOL/L (ref 99–110)
CHOLESTEROL, TOTAL: 106 MG/DL (ref 0–199)
CO2: 25 MMOL/L (ref 21–32)
CREAT SERPL-MCNC: 1 MG/DL (ref 0.8–1.3)
EKG ATRIAL RATE: 69 BPM
EKG DIAGNOSIS: NORMAL
EKG P AXIS: 31 DEGREES
EKG P-R INTERVAL: 128 MS
EKG Q-T INTERVAL: 386 MS
EKG QRS DURATION: 82 MS
EKG QTC CALCULATION (BAZETT): 413 MS
EKG R AXIS: 37 DEGREES
EKG T AXIS: 7 DEGREES
EKG VENTRICULAR RATE: 69 BPM
GFR AFRICAN AMERICAN: >60
GFR NON-AFRICAN AMERICAN: >60
GLUCOSE BLD-MCNC: 158 MG/DL (ref 70–99)
GLUCOSE BLD-MCNC: 173 MG/DL (ref 70–99)
HDLC SERPL-MCNC: 33 MG/DL (ref 40–60)
LDL CHOLESTEROL CALCULATED: 29 MG/DL
PERFORMED ON: ABNORMAL
POTASSIUM REFLEX MAGNESIUM: 4.9 MMOL/L (ref 3.5–5.1)
SODIUM BLD-SCNC: 138 MMOL/L (ref 136–145)
TRIGL SERPL-MCNC: 219 MG/DL (ref 0–150)
VLDLC SERPL CALC-MCNC: 44 MG/DL

## 2019-10-18 PROCEDURE — 2580000003 HC RX 258: Performed by: INTERNAL MEDICINE

## 2019-10-18 PROCEDURE — 99233 SBSQ HOSP IP/OBS HIGH 50: CPT | Performed by: NURSE PRACTITIONER

## 2019-10-18 PROCEDURE — 6370000000 HC RX 637 (ALT 250 FOR IP): Performed by: INTERNAL MEDICINE

## 2019-10-18 PROCEDURE — 6360000002 HC RX W HCPCS: Performed by: INTERNAL MEDICINE

## 2019-10-18 PROCEDURE — 6370000000 HC RX 637 (ALT 250 FOR IP): Performed by: NURSE PRACTITIONER

## 2019-10-18 PROCEDURE — 36415 COLL VENOUS BLD VENIPUNCTURE: CPT

## 2019-10-18 PROCEDURE — 80061 LIPID PANEL: CPT

## 2019-10-18 PROCEDURE — 80048 BASIC METABOLIC PNL TOTAL CA: CPT

## 2019-10-18 RX ORDER — ISOSORBIDE MONONITRATE 30 MG/1
30 TABLET, EXTENDED RELEASE ORAL DAILY
Qty: 30 TABLET | Refills: 3 | Status: SHIPPED | OUTPATIENT
Start: 2019-10-19 | End: 2019-10-28 | Stop reason: SINTOL

## 2019-10-18 RX ORDER — NITROGLYCERIN 0.4 MG/1
0.4 TABLET SUBLINGUAL EVERY 5 MIN PRN
Qty: 25 TABLET | Refills: 3 | Status: SHIPPED | OUTPATIENT
Start: 2019-10-18

## 2019-10-18 RX ADMIN — INSULIN LISPRO 1 UNITS: 100 INJECTION, SOLUTION INTRAVENOUS; SUBCUTANEOUS at 08:44

## 2019-10-18 RX ADMIN — LINAGLIPTIN 5 MG: 5 TABLET, FILM COATED ORAL at 08:42

## 2019-10-18 RX ADMIN — CARVEDILOL 6.25 MG: 6.25 TABLET, FILM COATED ORAL at 08:42

## 2019-10-18 RX ADMIN — GLIPIZIDE 5 MG: 5 TABLET ORAL at 08:53

## 2019-10-18 RX ADMIN — ISOSORBIDE MONONITRATE 30 MG: 30 TABLET, EXTENDED RELEASE ORAL at 08:42

## 2019-10-18 RX ADMIN — Medication 10 ML: at 08:43

## 2019-10-18 RX ADMIN — Medication 10 ML: at 08:42

## 2019-10-18 RX ADMIN — ACETAMINOPHEN 650 MG: 325 TABLET ORAL at 06:16

## 2019-10-18 RX ADMIN — ENOXAPARIN SODIUM 40 MG: 40 INJECTION SUBCUTANEOUS at 08:44

## 2019-10-18 RX ADMIN — TICAGRELOR 90 MG: 90 TABLET ORAL at 10:24

## 2019-10-18 RX ADMIN — LISINOPRIL 5 MG: 5 TABLET ORAL at 08:42

## 2019-10-18 RX ADMIN — TAMSULOSIN HYDROCHLORIDE 0.4 MG: 0.4 CAPSULE ORAL at 08:42

## 2019-10-18 RX ADMIN — SPIRONOLACTONE 25 MG: 25 TABLET ORAL at 08:42

## 2019-10-18 RX ADMIN — GABAPENTIN 600 MG: 300 CAPSULE ORAL at 08:42

## 2019-10-18 RX ADMIN — ASPIRIN 81 MG 81 MG: 81 TABLET ORAL at 08:42

## 2019-10-18 ASSESSMENT — PAIN SCALES - GENERAL
PAINLEVEL_OUTOF10: 0

## 2019-10-22 ENCOUNTER — TELEPHONE (OUTPATIENT)
Dept: CARDIOLOGY CLINIC | Age: 64
End: 2019-10-22

## 2019-10-28 ENCOUNTER — OFFICE VISIT (OUTPATIENT)
Dept: CARDIOLOGY CLINIC | Age: 64
End: 2019-10-28
Payer: OTHER GOVERNMENT

## 2019-10-28 VITALS
WEIGHT: 222 LBS | HEART RATE: 78 BPM | HEIGHT: 72 IN | BODY MASS INDEX: 30.07 KG/M2 | OXYGEN SATURATION: 98 % | DIASTOLIC BLOOD PRESSURE: 54 MMHG | SYSTOLIC BLOOD PRESSURE: 110 MMHG

## 2019-10-28 DIAGNOSIS — I20.0 UNSTABLE ANGINA (HCC): ICD-10-CM

## 2019-10-28 DIAGNOSIS — I25.5 ISCHEMIC CARDIOMYOPATHY: ICD-10-CM

## 2019-10-28 DIAGNOSIS — E78.5 HYPERLIPIDEMIA, UNSPECIFIED HYPERLIPIDEMIA TYPE: ICD-10-CM

## 2019-10-28 DIAGNOSIS — R53.83 OTHER FATIGUE: ICD-10-CM

## 2019-10-28 DIAGNOSIS — R06.02 SOB (SHORTNESS OF BREATH): ICD-10-CM

## 2019-10-28 DIAGNOSIS — I25.110 CORONARY ARTERY DISEASE INVOLVING NATIVE CORONARY ARTERY OF NATIVE HEART WITH UNSTABLE ANGINA PECTORIS (HCC): ICD-10-CM

## 2019-10-28 DIAGNOSIS — I10 ESSENTIAL HYPERTENSION: ICD-10-CM

## 2019-10-28 DIAGNOSIS — I24.9 ACS (ACUTE CORONARY SYNDROME) (HCC): Primary | ICD-10-CM

## 2019-10-28 PROCEDURE — 3017F COLORECTAL CA SCREEN DOC REV: CPT | Performed by: INTERNAL MEDICINE

## 2019-10-28 PROCEDURE — G8484 FLU IMMUNIZE NO ADMIN: HCPCS | Performed by: INTERNAL MEDICINE

## 2019-10-28 PROCEDURE — G8427 DOCREV CUR MEDS BY ELIG CLIN: HCPCS | Performed by: INTERNAL MEDICINE

## 2019-10-28 PROCEDURE — 1123F ACP DISCUSS/DSCN MKR DOCD: CPT | Performed by: INTERNAL MEDICINE

## 2019-10-28 PROCEDURE — 1111F DSCHRG MED/CURRENT MED MERGE: CPT | Performed by: INTERNAL MEDICINE

## 2019-10-28 PROCEDURE — G8598 ASA/ANTIPLAT THER USED: HCPCS | Performed by: INTERNAL MEDICINE

## 2019-10-28 PROCEDURE — G8417 CALC BMI ABV UP PARAM F/U: HCPCS | Performed by: INTERNAL MEDICINE

## 2019-10-28 PROCEDURE — 4040F PNEUMOC VAC/ADMIN/RCVD: CPT | Performed by: INTERNAL MEDICINE

## 2019-10-28 PROCEDURE — 99215 OFFICE O/P EST HI 40 MIN: CPT | Performed by: INTERNAL MEDICINE

## 2019-10-28 PROCEDURE — 1036F TOBACCO NON-USER: CPT | Performed by: INTERNAL MEDICINE

## 2019-11-06 ENCOUNTER — TELEPHONE (OUTPATIENT)
Dept: CARDIOLOGY CLINIC | Age: 64
End: 2019-11-06

## 2019-12-04 ENCOUNTER — HOSPITAL ENCOUNTER (OUTPATIENT)
Dept: CARDIAC CATH/INVASIVE PROCEDURES | Age: 64
Discharge: HOME | End: 2019-12-05
Attending: INTERNAL MEDICINE | Admitting: INTERNAL MEDICINE
Payer: OTHER GOVERNMENT

## 2019-12-04 LAB
ANION GAP SERPL CALCULATED.3IONS-SCNC: 10 MMOL/L (ref 3–16)
BUN BLDV-MCNC: 16 MG/DL (ref 7–20)
CALCIUM SERPL-MCNC: 9.5 MG/DL (ref 8.3–10.6)
CHLORIDE BLD-SCNC: 105 MMOL/L (ref 99–110)
CHOLESTEROL, TOTAL: 116 MG/DL (ref 0–199)
CO2: 27 MMOL/L (ref 21–32)
CREAT SERPL-MCNC: 0.9 MG/DL (ref 0.8–1.3)
EKG ATRIAL RATE: 63 BPM
EKG DIAGNOSIS: NORMAL
EKG P AXIS: -5 DEGREES
EKG P-R INTERVAL: 122 MS
EKG Q-T INTERVAL: 410 MS
EKG QRS DURATION: 82 MS
EKG QTC CALCULATION (BAZETT): 419 MS
EKG R AXIS: 51 DEGREES
EKG T AXIS: 1 DEGREES
EKG VENTRICULAR RATE: 63 BPM
GFR AFRICAN AMERICAN: >60
GFR NON-AFRICAN AMERICAN: >60
GLUCOSE BLD-MCNC: 110 MG/DL (ref 70–99)
GLUCOSE BLD-MCNC: 168 MG/DL (ref 70–99)
GLUCOSE BLD-MCNC: 232 MG/DL (ref 70–99)
HCT VFR BLD CALC: 38.3 % (ref 40.5–52.5)
HDLC SERPL-MCNC: 32 MG/DL (ref 40–60)
HEMOGLOBIN: 13 G/DL (ref 13.5–17.5)
INR BLD: 1.01 (ref 0.86–1.14)
LDL CHOLESTEROL CALCULATED: 52 MG/DL
MCH RBC QN AUTO: 30.2 PG (ref 26–34)
MCHC RBC AUTO-ENTMCNC: 33.9 G/DL (ref 31–36)
MCV RBC AUTO: 89.3 FL (ref 80–100)
PDW BLD-RTO: 14.4 % (ref 12.4–15.4)
PERFORMED ON: ABNORMAL
PERFORMED ON: ABNORMAL
PLATELET # BLD: 152 K/UL (ref 135–450)
PMV BLD AUTO: 6.5 FL (ref 5–10.5)
POTASSIUM SERPL-SCNC: 4.6 MMOL/L (ref 3.5–5.1)
PROTHROMBIN TIME: 11.7 SEC (ref 10–13.2)
RBC # BLD: 4.29 M/UL (ref 4.2–5.9)
SODIUM BLD-SCNC: 142 MMOL/L (ref 136–145)
TRIGL SERPL-MCNC: 162 MG/DL (ref 0–150)
VLDLC SERPL CALC-MCNC: 32 MG/DL
WBC # BLD: 6.6 K/UL (ref 4–11)

## 2019-12-04 PROCEDURE — C1725 CATH, TRANSLUMIN NON-LASER: HCPCS

## 2019-12-04 PROCEDURE — 92933 PRQ TRLML C ATHRC ST ANGIOP1: CPT | Performed by: INTERNAL MEDICINE

## 2019-12-04 PROCEDURE — 92978 ENDOLUMINL IVUS OCT C 1ST: CPT | Performed by: INTERNAL MEDICINE

## 2019-12-04 PROCEDURE — 85027 COMPLETE CBC AUTOMATED: CPT

## 2019-12-04 PROCEDURE — 80061 LIPID PANEL: CPT

## 2019-12-04 PROCEDURE — 99153 MOD SED SAME PHYS/QHP EA: CPT

## 2019-12-04 PROCEDURE — 2720000010 HC SURG SUPPLY STERILE

## 2019-12-04 PROCEDURE — C9602 PERC D-E COR STENT ATHER S: HCPCS

## 2019-12-04 PROCEDURE — 93571 IV DOP VEL&/PRESS C FLO 1ST: CPT | Performed by: INTERNAL MEDICINE

## 2019-12-04 PROCEDURE — 6370000000 HC RX 637 (ALT 250 FOR IP)

## 2019-12-04 PROCEDURE — 85610 PROTHROMBIN TIME: CPT

## 2019-12-04 PROCEDURE — 85347 COAGULATION TIME ACTIVATED: CPT

## 2019-12-04 PROCEDURE — 99156 MOD SED OTH PHYS/QHP 5/>YRS: CPT

## 2019-12-04 PROCEDURE — 93454 CORONARY ARTERY ANGIO S&I: CPT

## 2019-12-04 PROCEDURE — C1769 GUIDE WIRE: HCPCS

## 2019-12-04 PROCEDURE — 83036 HEMOGLOBIN GLYCOSYLATED A1C: CPT

## 2019-12-04 PROCEDURE — 2500000003 HC RX 250 WO HCPCS

## 2019-12-04 PROCEDURE — C1887 CATHETER, GUIDING: HCPCS

## 2019-12-04 PROCEDURE — 92978 ENDOLUMINL IVUS OCT C 1ST: CPT

## 2019-12-04 PROCEDURE — C1894 INTRO/SHEATH, NON-LASER: HCPCS

## 2019-12-04 PROCEDURE — 93571 IV DOP VEL&/PRESS C FLO 1ST: CPT

## 2019-12-04 PROCEDURE — 6360000004 HC RX CONTRAST MEDICATION

## 2019-12-04 PROCEDURE — 2580000003 HC RX 258

## 2019-12-04 PROCEDURE — 2580000003 HC RX 258: Performed by: INTERNAL MEDICINE

## 2019-12-04 PROCEDURE — C1874 STENT, COATED/COV W/DEL SYS: HCPCS

## 2019-12-04 PROCEDURE — C1724 CATH, TRANS ATHEREC,ROTATION: HCPCS

## 2019-12-04 PROCEDURE — 93005 ELECTROCARDIOGRAM TRACING: CPT | Performed by: INTERNAL MEDICINE

## 2019-12-04 PROCEDURE — 6360000002 HC RX W HCPCS

## 2019-12-04 PROCEDURE — 80048 BASIC METABOLIC PNL TOTAL CA: CPT

## 2019-12-04 PROCEDURE — 2709999900 HC NON-CHARGEABLE SUPPLY

## 2019-12-04 PROCEDURE — 6370000000 HC RX 637 (ALT 250 FOR IP): Performed by: INTERNAL MEDICINE

## 2019-12-04 PROCEDURE — C1753 CATH, INTRAVAS ULTRASOUND: HCPCS

## 2019-12-04 PROCEDURE — 99152 MOD SED SAME PHYS/QHP 5/>YRS: CPT

## 2019-12-04 RX ORDER — FENTANYL CITRATE 50 UG/ML
INJECTION, SOLUTION INTRAMUSCULAR; INTRAVENOUS
Status: COMPLETED | OUTPATIENT
Start: 2019-12-04 | End: 2019-12-04

## 2019-12-04 RX ORDER — SODIUM CHLORIDE 9 MG/ML
INJECTION, SOLUTION INTRAVENOUS CONTINUOUS
Status: DISCONTINUED | OUTPATIENT
Start: 2019-12-04 | End: 2019-12-05

## 2019-12-04 RX ORDER — MIDAZOLAM HYDROCHLORIDE 1 MG/ML
INJECTION INTRAMUSCULAR; INTRAVENOUS
Status: COMPLETED | OUTPATIENT
Start: 2019-12-04 | End: 2019-12-04

## 2019-12-04 RX ORDER — DEXTROSE MONOHYDRATE 50 MG/ML
100 INJECTION, SOLUTION INTRAVENOUS PRN
Status: DISCONTINUED | OUTPATIENT
Start: 2019-12-04 | End: 2019-12-05 | Stop reason: HOSPADM

## 2019-12-04 RX ORDER — CARVEDILOL 6.25 MG/1
6.25 TABLET ORAL 2 TIMES DAILY WITH MEALS
Status: DISCONTINUED | OUTPATIENT
Start: 2019-12-04 | End: 2019-12-05 | Stop reason: HOSPADM

## 2019-12-04 RX ORDER — NICOTINE POLACRILEX 4 MG
15 LOZENGE BUCCAL PRN
Status: DISCONTINUED | OUTPATIENT
Start: 2019-12-04 | End: 2019-12-05 | Stop reason: HOSPADM

## 2019-12-04 RX ORDER — CLOPIDOGREL BISULFATE 75 MG/1
75 TABLET ORAL DAILY
Status: DISCONTINUED | OUTPATIENT
Start: 2019-12-05 | End: 2019-12-05 | Stop reason: HOSPADM

## 2019-12-04 RX ORDER — HEPARIN SODIUM 1000 [USP'U]/ML
INJECTION, SOLUTION INTRAVENOUS; SUBCUTANEOUS
Status: COMPLETED | OUTPATIENT
Start: 2019-12-04 | End: 2019-12-04

## 2019-12-04 RX ORDER — NITROGLYCERIN 0.4 MG/1
0.4 TABLET SUBLINGUAL EVERY 5 MIN PRN
Status: DISCONTINUED | OUTPATIENT
Start: 2019-12-04 | End: 2019-12-05 | Stop reason: HOSPADM

## 2019-12-04 RX ORDER — SODIUM CHLORIDE 0.9 % (FLUSH) 0.9 %
10 SYRINGE (ML) INJECTION EVERY 12 HOURS SCHEDULED
Status: DISCONTINUED | OUTPATIENT
Start: 2019-12-04 | End: 2019-12-05 | Stop reason: HOSPADM

## 2019-12-04 RX ORDER — ACETAMINOPHEN 325 MG/1
650 TABLET ORAL EVERY 4 HOURS PRN
Status: DISCONTINUED | OUTPATIENT
Start: 2019-12-04 | End: 2019-12-05 | Stop reason: HOSPADM

## 2019-12-04 RX ORDER — CLOPIDOGREL BISULFATE 75 MG/1
75 TABLET ORAL DAILY
Status: ON HOLD | COMMUNITY
End: 2022-07-24

## 2019-12-04 RX ORDER — TAMSULOSIN HYDROCHLORIDE 0.4 MG/1
0.4 CAPSULE ORAL DAILY
Status: DISCONTINUED | OUTPATIENT
Start: 2019-12-04 | End: 2019-12-05 | Stop reason: HOSPADM

## 2019-12-04 RX ORDER — GLIPIZIDE 5 MG/1
5 TABLET ORAL
Status: DISCONTINUED | OUTPATIENT
Start: 2019-12-04 | End: 2019-12-05 | Stop reason: HOSPADM

## 2019-12-04 RX ORDER — ATORVASTATIN CALCIUM 80 MG/1
80 TABLET, FILM COATED ORAL NIGHTLY
Status: DISCONTINUED | OUTPATIENT
Start: 2019-12-04 | End: 2019-12-05 | Stop reason: HOSPADM

## 2019-12-04 RX ORDER — CLOPIDOGREL 300 MG/1
TABLET, FILM COATED ORAL
Status: COMPLETED | OUTPATIENT
Start: 2019-12-04 | End: 2019-12-04

## 2019-12-04 RX ORDER — BACLOFEN 10 MG/1
10 TABLET ORAL 3 TIMES DAILY PRN
Status: DISCONTINUED | OUTPATIENT
Start: 2019-12-04 | End: 2019-12-05 | Stop reason: HOSPADM

## 2019-12-04 RX ORDER — SODIUM CHLORIDE 0.9 % (FLUSH) 0.9 %
10 SYRINGE (ML) INJECTION PRN
Status: DISCONTINUED | OUTPATIENT
Start: 2019-12-04 | End: 2019-12-05 | Stop reason: HOSPADM

## 2019-12-04 RX ORDER — DEXTROSE MONOHYDRATE 25 G/50ML
12.5 INJECTION, SOLUTION INTRAVENOUS PRN
Status: DISCONTINUED | OUTPATIENT
Start: 2019-12-04 | End: 2019-12-05 | Stop reason: HOSPADM

## 2019-12-04 RX ORDER — NICOTINE POLACRILEX 4 MG
15 LOZENGE BUCCAL PRN
Status: DISCONTINUED | OUTPATIENT
Start: 2019-12-04 | End: 2019-12-04 | Stop reason: SDUPTHER

## 2019-12-04 RX ORDER — DEXTROSE MONOHYDRATE 25 G/50ML
12.5 INJECTION, SOLUTION INTRAVENOUS PRN
Status: DISCONTINUED | OUTPATIENT
Start: 2019-12-04 | End: 2019-12-04 | Stop reason: SDUPTHER

## 2019-12-04 RX ORDER — DEXTROSE MONOHYDRATE 50 MG/ML
100 INJECTION, SOLUTION INTRAVENOUS PRN
Status: DISCONTINUED | OUTPATIENT
Start: 2019-12-04 | End: 2019-12-04 | Stop reason: SDUPTHER

## 2019-12-04 RX ORDER — GABAPENTIN 300 MG/1
600 CAPSULE ORAL 2 TIMES DAILY
Status: DISCONTINUED | OUTPATIENT
Start: 2019-12-04 | End: 2019-12-05 | Stop reason: HOSPADM

## 2019-12-04 RX ORDER — LISINOPRIL 5 MG/1
5 TABLET ORAL DAILY
Status: DISCONTINUED | OUTPATIENT
Start: 2019-12-04 | End: 2019-12-05 | Stop reason: HOSPADM

## 2019-12-04 RX ORDER — ASPIRIN 81 MG/1
81 TABLET, CHEWABLE ORAL DAILY
Status: DISCONTINUED | OUTPATIENT
Start: 2019-12-05 | End: 2019-12-05 | Stop reason: HOSPADM

## 2019-12-04 RX ADMIN — CARVEDILOL 6.25 MG: 6.25 TABLET, FILM COATED ORAL at 18:23

## 2019-12-04 RX ADMIN — MIDAZOLAM HYDROCHLORIDE 1 MG: 1 INJECTION INTRAMUSCULAR; INTRAVENOUS at 10:46

## 2019-12-04 RX ADMIN — TAMSULOSIN HYDROCHLORIDE 0.4 MG: 0.4 CAPSULE ORAL at 20:31

## 2019-12-04 RX ADMIN — CLOPIDOGREL 300 MG: 300 TABLET, FILM COATED ORAL at 11:29

## 2019-12-04 RX ADMIN — MIDAZOLAM HYDROCHLORIDE 1 MG: 1 INJECTION INTRAMUSCULAR; INTRAVENOUS at 11:15

## 2019-12-04 RX ADMIN — INSULIN LISPRO 4 UNITS: 100 INJECTION, SOLUTION INTRAVENOUS; SUBCUTANEOUS at 18:22

## 2019-12-04 RX ADMIN — HEPARIN SODIUM 2000 UNITS: 1000 INJECTION, SOLUTION INTRAVENOUS; SUBCUTANEOUS at 10:36

## 2019-12-04 RX ADMIN — FENTANYL CITRATE 50 MCG: 50 INJECTION, SOLUTION INTRAMUSCULAR; INTRAVENOUS at 11:04

## 2019-12-04 RX ADMIN — HEPARIN SODIUM 2000 UNITS: 1000 INJECTION, SOLUTION INTRAVENOUS; SUBCUTANEOUS at 11:04

## 2019-12-04 RX ADMIN — ATORVASTATIN CALCIUM 80 MG: 80 TABLET, FILM COATED ORAL at 20:31

## 2019-12-04 RX ADMIN — HEPARIN SODIUM 5000 UNITS: 1000 INJECTION, SOLUTION INTRAVENOUS; SUBCUTANEOUS at 09:54

## 2019-12-04 RX ADMIN — GABAPENTIN 600 MG: 300 CAPSULE ORAL at 20:31

## 2019-12-04 RX ADMIN — FENTANYL CITRATE 50 MCG: 50 INJECTION, SOLUTION INTRAMUSCULAR; INTRAVENOUS at 09:21

## 2019-12-04 RX ADMIN — HEPARIN SODIUM 2000 UNITS: 1000 INJECTION, SOLUTION INTRAVENOUS; SUBCUTANEOUS at 10:48

## 2019-12-04 RX ADMIN — HEPARIN SODIUM 1000 UNITS: 1000 INJECTION, SOLUTION INTRAVENOUS; SUBCUTANEOUS at 10:25

## 2019-12-04 RX ADMIN — MIDAZOLAM HYDROCHLORIDE 1 MG: 1 INJECTION INTRAMUSCULAR; INTRAVENOUS at 09:31

## 2019-12-04 RX ADMIN — SODIUM CHLORIDE: 9 INJECTION, SOLUTION INTRAVENOUS at 08:19

## 2019-12-04 RX ADMIN — SODIUM CHLORIDE: 9 INJECTION, SOLUTION INTRAVENOUS at 21:30

## 2019-12-04 RX ADMIN — MIDAZOLAM HYDROCHLORIDE 2 MG: 1 INJECTION INTRAMUSCULAR; INTRAVENOUS at 09:22

## 2019-12-04 RX ADMIN — HEPARIN SODIUM 2000 UNITS: 1000 INJECTION, SOLUTION INTRAVENOUS; SUBCUTANEOUS at 10:13

## 2019-12-04 RX ADMIN — Medication 10 ML: at 20:32

## 2019-12-04 RX ADMIN — FENTANYL CITRATE 50 MCG: 50 INJECTION, SOLUTION INTRAMUSCULAR; INTRAVENOUS at 09:32

## 2019-12-04 ASSESSMENT — PAIN SCALES - GENERAL: PAINLEVEL_OUTOF10: 0

## 2019-12-04 NOTE — PROCEDURES
LAD Heavily and severely calcified in the proximal and mid vessels. Approximately 20 to 30% stenosis. Mid 60 to 70% stenosis. Distal 20 to 30% stenosis. Forms a \"whales tail\" at   apex.   D1 is large vessel with ostial/prox 30% stenosis. LCX Seen in limited views, overall appears similar to prior catheterization. Small to medium sized vessel.  Ostial/prox 30-40% stenosis.         RCA Dominant, large vessel with calcification noted. There is proximal 50 to 60% stenosis. Mid 40 to 50% stenosis. Distal 30 to 40% stenosis. There are a total of 3 PDA vessels. The first two PDA vessels are small and have ostial 80 to 95% stenoses. The second PDA was known to be occluded from the last catheterization during which time there was unsuccessful attempt intervention on this branch vessel, however, this vessel has had spontaneous recannulation. The third PDA has xhpdbwgt-tny-vcnpcb 20 to 30% stenosis. The our PLV has proximal-mid 30 to 40% stenoses. IFR and PERCUTANEOUS INTERVENTION DESCRIPTION     Heparin was used for anticoagulation, attention turned towards the RCA initially and a 6 Western An JR 5 guiding catheter was taken. This would not directly cannulate the RCA ostium and was placed just up to the RCA ostium and then a choice floppy wire was used to cross the gap and then the guide catheter was advanced into the RCA. Angiography was performed and then attention turned towards IFR of the RCA and the [facility] IFR wire was appropriately zeroed, calibrated and then ultimately normalized in the a sending aorta. IFR measurements showed an average value of 0.96 which was consistent with moderate proximal RCA disease. As such, attention turned towards the left-sided coronary angiography and PCI and equipment was removed from the RCA. A 7 Lithuanian XB 3.5 guiding catheter was advanced to the left main ostium and the left main was engaged with this guiding catheter.   A Viper wire was used to

## 2019-12-04 NOTE — H&P
Brief Pre-Op Note/Sedation Assessment      Qi Carrasco  1955  Cath Pool Rm/NONE      [id number]  8:23 AM    Planned Procedure: Cardiac Catheterization Procedure    Post Procedure Plan: Return to same level of care    Consent: I have discussed with the patient and/or the patient representative the indication, alternatives, and the possible risks and/or complications of the planned procedure and the anesthesia methods. The patient and/or patient representative appear to understand and agree to proceed. DISCUSSION OF CARDIAC CATHETERIZATION PROCEDURES: The procedures, indications, risks and alternatives have been discussed with the patient and, as appropriate, with the patient's guardian . Risks discussed included, but are not limited to, bleeding, development of blood clots/emboli, damage to blood vessels, renal failure, malignant cardiac arrhythmias, stroke, heart attack, emergent coronary bypass surgery, death, dye allergy. The patient (and guardian as appropriate) expressed understanding of the aforementioned and wished to proceed. D/w pt and family may need staged PCI, depending on course of procedure, plan will be for PCI LAD/RCA, this may changed depending on course of procedure. They understand this is a higher risk procedure due to vessel calcification. They decline surgery eval for CABG at this time. They understand it may be needed in future pending course of this procedure and clinical f/u. Chief Complaint: Fatigue, this may be anginal equivalent, class 3      Indications for Cath Procedure:  Cardiomyopathy  Anginal Classification within 2 weeks:  CCS III - Symptoms with everyday living activities, i.e., moderate limitation  NYHA Heart Failure Class within 2 weeks: Class III - Symptoms of HF on less-than-ordinary exertion, Newly Diagnosed?  No, Heart Failure Type: Systolic  Is Cath Lab Visit Valve-related?: No  Surgical Risk: N/A  Functional Type: N/A    Anti- Anginal Meds within 10/28/19 reviewed, only other change is brilinta switched to plavix d/t cost issues. Prior History of Anesthesia Complications:   none    Modified Mallampati:  III (soft palate, base of uvula visible)    ASA Classification:  Class 3 - A patient with severe systemic disease that limits activity but is not incapacitating      Bobo Scale: Activity:  2 - Able to move 4 extremities voluntarily on command  Respiration:  2 - Able to breathe deeply and cough freely  Circulation:  2 - BP+/- 20mmHg of normal  Consciousness:  2 - Fully awake  Oxygen Saturation (color):  2 - Able to maintain oxygen saturation >92% on room air    Sedation/Anesthesia Plan:  Guard the patient's safety and welfare. Minimize physical discomfort and pain. Minimize negative psychological responses to treatment by providing sedation and analgesia and maximize the potential amnesia. Patient to meet pre-procedure discharge plan.     Medication Planned:  midazolam intravenously and fentanyl intravenously    Patient is an appropriate candidate for plan of sedation: yes       Electronically signed by Ricardo Lilly MD on 12/4/2019 at 8:23 AM

## 2019-12-05 VITALS
DIASTOLIC BLOOD PRESSURE: 69 MMHG | WEIGHT: 212 LBS | BODY MASS INDEX: 28.71 KG/M2 | TEMPERATURE: 98.4 F | RESPIRATION RATE: 16 BRPM | HEIGHT: 72 IN | SYSTOLIC BLOOD PRESSURE: 117 MMHG | OXYGEN SATURATION: 96 % | HEART RATE: 68 BPM

## 2019-12-05 LAB
ANION GAP SERPL CALCULATED.3IONS-SCNC: 10 MMOL/L (ref 3–16)
BUN BLDV-MCNC: 12 MG/DL (ref 7–20)
CALCIUM SERPL-MCNC: 8.6 MG/DL (ref 8.3–10.6)
CHLORIDE BLD-SCNC: 105 MMOL/L (ref 99–110)
CO2: 22 MMOL/L (ref 21–32)
CREAT SERPL-MCNC: 0.7 MG/DL (ref 0.8–1.3)
EKG ATRIAL RATE: 65 BPM
EKG DIAGNOSIS: NORMAL
EKG P AXIS: 15 DEGREES
EKG P-R INTERVAL: 120 MS
EKG Q-T INTERVAL: 408 MS
EKG QRS DURATION: 78 MS
EKG QTC CALCULATION (BAZETT): 424 MS
EKG R AXIS: 52 DEGREES
EKG T AXIS: 17 DEGREES
EKG VENTRICULAR RATE: 65 BPM
ESTIMATED AVERAGE GLUCOSE: 131.2 MG/DL
GFR AFRICAN AMERICAN: >60
GFR NON-AFRICAN AMERICAN: >60
GLUCOSE BLD-MCNC: 149 MG/DL (ref 70–99)
GLUCOSE BLD-MCNC: 152 MG/DL (ref 70–99)
GLUCOSE BLD-MCNC: 216 MG/DL (ref 70–99)
HBA1C MFR BLD: 6.2 %
HCT VFR BLD CALC: 34.3 % (ref 40.5–52.5)
HEMOGLOBIN: 11.8 G/DL (ref 13.5–17.5)
MCH RBC QN AUTO: 30.4 PG (ref 26–34)
MCHC RBC AUTO-ENTMCNC: 34.3 G/DL (ref 31–36)
MCV RBC AUTO: 88.8 FL (ref 80–100)
PDW BLD-RTO: 14.3 % (ref 12.4–15.4)
PERFORMED ON: ABNORMAL
PERFORMED ON: ABNORMAL
PLATELET # BLD: 133 K/UL (ref 135–450)
PMV BLD AUTO: 6.5 FL (ref 5–10.5)
POC ACT LR: 193 SEC
POC ACT LR: 231 SEC
POC ACT LR: 249 SEC
POC ACT LR: 256 SEC
POC ACT LR: 258 SEC
POC ACT LR: >400 SEC
POC ACT LR: >400 SEC
POTASSIUM SERPL-SCNC: 4.3 MMOL/L (ref 3.5–5.1)
RBC # BLD: 3.86 M/UL (ref 4.2–5.9)
SODIUM BLD-SCNC: 137 MMOL/L (ref 136–145)
WBC # BLD: 7.8 K/UL (ref 4–11)

## 2019-12-05 PROCEDURE — 2580000003 HC RX 258: Performed by: INTERNAL MEDICINE

## 2019-12-05 PROCEDURE — 80048 BASIC METABOLIC PNL TOTAL CA: CPT

## 2019-12-05 PROCEDURE — 93005 ELECTROCARDIOGRAM TRACING: CPT | Performed by: INTERNAL MEDICINE

## 2019-12-05 PROCEDURE — 99217 PR OBSERVATION CARE DISCHARGE MANAGEMENT: CPT | Performed by: NURSE PRACTITIONER

## 2019-12-05 PROCEDURE — 6370000000 HC RX 637 (ALT 250 FOR IP): Performed by: INTERNAL MEDICINE

## 2019-12-05 PROCEDURE — 85027 COMPLETE CBC AUTOMATED: CPT

## 2019-12-05 RX ADMIN — ASPIRIN 81 MG 81 MG: 81 TABLET ORAL at 10:04

## 2019-12-05 RX ADMIN — Medication 10 ML: at 10:05

## 2019-12-05 RX ADMIN — GABAPENTIN 600 MG: 300 CAPSULE ORAL at 10:04

## 2019-12-05 RX ADMIN — LISINOPRIL 5 MG: 5 TABLET ORAL at 10:04

## 2019-12-05 RX ADMIN — CARVEDILOL 6.25 MG: 6.25 TABLET, FILM COATED ORAL at 10:04

## 2019-12-05 RX ADMIN — GLIPIZIDE 5 MG: 5 TABLET ORAL at 10:04

## 2019-12-05 RX ADMIN — INSULIN LISPRO 2 UNITS: 100 INJECTION, SOLUTION INTRAVENOUS; SUBCUTANEOUS at 10:19

## 2019-12-05 RX ADMIN — CLOPIDOGREL BISULFATE 75 MG: 75 TABLET ORAL at 10:04

## 2019-12-05 NOTE — PROGRESS NOTES
Patient admitted to room 432 from cath lab. Patient oriented to room, call light, bed rails, phone, lights and bathroom. Patient instructed about the schedule of the day including: vital sign frequency, lab draws, possible tests, frequency of MD and staff rounds, including RN/MD rounding together Telemetry box 75 in place, patient aware of placement and reason. Bed locked, in lowest position, side rails up 2/4, call light within reach. Will continue to monitor.

## 2019-12-05 NOTE — DISCHARGE SUMMARY
obtained by jumping the gap between the [address] [facility] with a choice floppy wire is a JR 5 guiding catheter was placed just near the ostium. Attention turned towards interventional procedures as noted below. At the conclusion of the procedure, a TR band was placed over the puncture site and hemostasis was obtained. There were no immediate complications. I supervised sedation with versed 5 mg/fentanyl 250 Mcg during the procedure. 215 cc contrast was utilized. <20cc EBL.       FINDINGS            CORONARY ARTERIES     LM Less than 10% pmgczphg-ndx-gzgdwb stenoses         LAD Heavily and severely calcified in the proximal and mid vessels. Approximately 20 to 30% stenosis. Mid 60 to 70% stenosis. Distal 20 to 30% stenosis. Forms a \"whales tail\" at   apex.   D1 is large vessel with ostial/prox 30% stenosis.       LCX Seen in limited views, overall appears similar to prior catheterization. Small to medium sized vessel.  Ostial/prox 30-40% stenosis.           RCA Dominant, large vessel with calcification noted. There is proximal 50 to 60% stenosis. Mid 40 to 50% stenosis. Distal 30 to 40% stenosis. There are a total of 3 PDA vessels. The first two PDA vessels are small and have ostial 80 to 95% stenoses. The second PDA was known to be occluded from the last catheterization during which time there was unsuccessful attempt intervention on this branch vessel, however, this vessel has had spontaneous recannulation. The third PDA has ezdljhor-eux-skhxrl 20 to 30% stenosis. The our PLV has proximal-mid 30 to 40% stenoses.               IFR and PERCUTANEOUS INTERVENTION DESCRIPTION      Heparin was used for anticoagulation, attention turned towards the RCA initially and a 6 Western An JR 5 guiding catheter was taken.   This would not directly cannulate the RCA ostium and was placed just up to the RCA ostium and then a choice floppy wire was used to cross the gap and then the guide catheter was advanced into disease involving native coronary artery of native heart with unstable angina pectoris ([facility] [address]    Ischemic cardiomyopathy    Unstable angina ([facility] [address]    ACS (acute coronary syndrome) (HCC)    Other fatigue    SOB (shortness of breath)        Discharged Condition: good  The patient was seen and examined on day of discharge and this discharge summary is in conjunction with any daily progress note from day of discharge. Consults:  IP CONSULT TO CARDIAC REHAB  Physical Exam:  /69   Pulse 68   Temp 98.4 °F (36.9 °C)   Resp 16   Ht 6' (1.829 m)   Wt 212 lb (96.2 kg)   SpO2 96%   BMI 28.75 kg/m²       Intake/Output Summary (Last 24 hours) at 12/5/2019 1046  Last data filed at 12/5/2019 1005  Gross per 24 hour   Intake 250 ml   Output --   Net 250 ml     General:  Awake, alert, NAD  Skin:  Warm and dry  Neck:  JVD<8, no bruit  Chest:  Clear to auscultation, no wheezes/rhonchi/rales  Cardiovascular:  RRR S1S2, no m/r/g,  Abdomen:  Soft, nontender, +bowel sounds  Extremities:  No BLE edema, right wrist without hematoma, mild bruising noted, no numbness or tingling, right brachial without hematoma or burising. Significant Diagnostic Studies:     Echo 10/3/19  Conclusions   Summary   The left ventricular systolic function is mildly reduced with an ejection   fraction of 45-50 %. There is hypokinesis of the basal/mid inferior wall. There is mild concentric left ventricular hypertrophy. Grade I diastolic dysfunction with normal left ventricular filling pressure. Cardiac cath 10/3/19  LVGRAM  LVEDP 7   GRADIENT ACROSS AORTIC VALVE None   LV FUNCTION EF 40%   WALL MOTION Mid inferior hypokinesis   MITRAL REGURGITATION Mild         CORONARY ARTERIES     LM <10% stenosis            LAD Heavily calcified. Prox 20-30% stenosis. Mid 60-70% stenosis. Distal 20-30% stenosis. Forms a \"whales tail\" at apex. D1 is large vessel with ostial/prox 30% stenosis. LCX Small to medium sized vessel.

## 2020-01-02 ENCOUNTER — OFFICE VISIT (OUTPATIENT)
Dept: CARDIOLOGY CLINIC | Age: 65
End: 2020-01-02
Payer: OTHER GOVERNMENT

## 2020-01-02 VITALS
HEIGHT: 72 IN | WEIGHT: 219.8 LBS | DIASTOLIC BLOOD PRESSURE: 80 MMHG | HEART RATE: 64 BPM | OXYGEN SATURATION: 99 % | BODY MASS INDEX: 29.77 KG/M2 | SYSTOLIC BLOOD PRESSURE: 110 MMHG

## 2020-01-02 PROCEDURE — 4040F PNEUMOC VAC/ADMIN/RCVD: CPT | Performed by: NURSE PRACTITIONER

## 2020-01-02 PROCEDURE — G8417 CALC BMI ABV UP PARAM F/U: HCPCS | Performed by: NURSE PRACTITIONER

## 2020-01-02 PROCEDURE — G8484 FLU IMMUNIZE NO ADMIN: HCPCS | Performed by: NURSE PRACTITIONER

## 2020-01-02 PROCEDURE — 3017F COLORECTAL CA SCREEN DOC REV: CPT | Performed by: NURSE PRACTITIONER

## 2020-01-02 PROCEDURE — G8427 DOCREV CUR MEDS BY ELIG CLIN: HCPCS | Performed by: NURSE PRACTITIONER

## 2020-01-02 PROCEDURE — 1036F TOBACCO NON-USER: CPT | Performed by: NURSE PRACTITIONER

## 2020-01-02 PROCEDURE — 99213 OFFICE O/P EST LOW 20 MIN: CPT | Performed by: NURSE PRACTITIONER

## 2020-01-02 PROCEDURE — 1123F ACP DISCUSS/DSCN MKR DOCD: CPT | Performed by: NURSE PRACTITIONER

## 2020-01-02 NOTE — PATIENT INSTRUCTIONS
Plan:   1. Continue aspirin and plavix for now  2. Continue statin  3. Continue coreg 6.25mg twice a day   4. Continue to stay as active as possible- discussed cardiac rehab  5. Follow up with the [place] as planned  6.  Follow up with Dr. Crow Manzano 4 months

## 2020-01-02 NOTE — PROGRESS NOTES
10/18/2019    PHOS 3.4 10/05/2019    BUN 12 12/05/2019    BUN 16 12/04/2019    BUN 20 10/18/2019    CREATININE 0.7 12/05/2019    CREATININE 0.9 12/04/2019    CREATININE 1.0 10/18/2019     BNP: No results found for: PROBNP  Lipids:   Lab Results   Component Value Date    CHOL 116 12/04/2019        Lab Results   Component Value Date    TRIG 162 (H) 12/04/2019        Lab Results   Component Value Date    HDL 32 (L) 12/04/2019        Lab Results   Component Value Date    LDLCALC 52 12/04/2019        Lab Results   Component Value Date    LABVLDL 32 12/04/2019      No results found for: CHOLHDLRATIO    EF:   Lab Results   Component Value Date    LVEF 43 10/16/2019       Recent Testing:  Echo 10/3/19  Conclusions   Summary   The left ventricular systolic function is mildly reduced with an ejection   fraction of 45-50 %.   There is hypokinesis of the basal/mid inferior wall.   There is mild concentric left ventricular hypertrophy.   Grade I diastolic dysfunction with normal left ventricular filling pressure.     Cardiac cath 10/3/19  LVGRAM  LVEDP 7   GRADIENT ACROSS AORTIC VALVE None   LV FUNCTION EF 40%   WALL MOTION Mid inferior hypokinesis   MITRAL REGURGITATION Mild         CORONARY ARTERIES     LM <10% stenosis            LAD Heavily calcified.  Prox 20-30% stenosis.  Mid 60-70% stenosis.  Distal 20-30% stenosis.  Forms a \"whales tail\" at apex.   D1 is large vessel with ostial/prox 30% stenosis.           LCX Small to medium sized vessel.  Ostial/prox 30-40% stenosis.            RCA Dominant.  Large vessel.  Heavily calcified.  Prox 60% stenosis.  Mid 30-40% stenosis.  There are 3 branches that course as RPDAs.  The first two have ostial 90-95% stenoses.  The third RPDA (largest vessel) has 60-70% stenosis.  RPLV is large vessel with ostial/prox 40% stenosis.       CONCLUSIONS:      Multivessel cad/ashd, treat medically, on dapt, bblocker/ace/statin, spirinolactone  Consider f/u ischemic functional testing, and staged PCI pending clinical response to medical therapy        Cath: 10/16/19  FINDINGS         LVGRAM     LVEDP 7   GRADIENT ACROSS AORTIC VALVE No gradient   LV FUNCTION EF 40-45%   WALL MOTION Mid-distal inferior hypokinesis   MITRAL REGURGITATION Mild         CORONARY ARTERIES     LM <10% stenosis            LAD Heavily calcified.  Prox 20-30% stenosis.  Mid 60-70%   stenosis.  Distal 20-30% stenosis.  Forms a \"whales tail\" at   apex.   D1 is large vessel with ostial/prox 30% stenosis.       LCX Small to medium sized vessel.  Ostial/prox 30-40% stenosis.           RCA Dominant.  Large vessel.  Heavily calcified.  Prox 60%   stenosis.  Mid 30-40% stenosis.  There are 3 branches that course   as RPDAs.  The first two have ostial 90-95% stenoses (of these, second one has slower flow with more significant stenosis).  The third   RPDA (largest vessel) has 60-70% stenosis.  RPLV is large vessel   with ostial/prox 40% stenosis.          PERCUTANEOUS INTERVENTION DESCRIPTION      Heparin was used for anticoagulation.  A 6fr AL 0.75 guide was used along with turnpike LP catheter and multiple wires (evelina blue, fielder xt, cougar, PT2 mod support, fighter,  200) were taken but ostial lesion in 2nd RPDA could not be crossed fully. Ness Shires was partially crossed but wire appeared to enter a false lumen.  Flow in 2nd RPDA was zero following this.  As there were no ekg changes or chest pain, it was felt that further attempts were not likely to be successful and procedure was completed.   There was 100% residual stenosis.  There was RAFY 1 flow before PCI and RAFY 0 flow after PCI.        CONCLUSIONS:      Unsuccessful PCI of 2nd RPDA  Continue medical therapy and plan for probable staged PCI of LAD and possibly prox RCA.       Cardiac cath 12/4/19  Coronary angiogam  Coronary cath  Temporary transvenous pacer insertion  IFR of RCA  IVUS of LAD  Orbital atherectomy of LAD  PCI of LAD with single drug-eluting stent     PROCEDURE Dominant, large vessel with calcification noted.  There is proximal 50 to 60% stenosis.  Mid 40 to 50% stenosis.  Distal 30 to 40% stenosis.  There are a total of 3 PDA vessels.  The first two PDA vessels are small and have ostial 80 to 95% stenoses.  The second PDA was known to be occluded from the last catheterization during which time there was unsuccessful attempt intervention on this branch vessel, however, this vessel has had spontaneous recannulation.  The third PDA has osbptjoh-xic-txzyxu 20 to 30% stenosis.  The our PLV has proximal-mid 30 to 40% stenoses.         IFR and PERCUTANEOUS INTERVENTION DESCRIPTION      Heparin was used for anticoagulation, attention turned towards the RCA initially and a 6 Western An JR 5 guiding catheter was taken.  This would not directly cannulate the RCA ostium and was placed just up to the RCA ostium and then a choice floppy wire was used to cross the gap and then the guide catheter was advanced into the RCA.  Angiography was performed and then attention turned towards IFR of the RCA and the [facility] IFR wire was appropriately zeroed, calibrated and then ultimately normalized in the a sending aorta.   IFR measurements showed an average value of 0.96 which was consistent with moderate proximal RCA disease.  As such, attention turned towards the left-sided coronary angiography and PCI and equipment was removed from the RCA.     A 7 Lithuanian XB 3.5 guiding catheter was advanced to the left main ostium and the left main was engaged with this guiding catheter.  A Viper wire was used to cross the LAD lesion, CHETNA was then performed of the LAD which demonstrated a severely calcified LAD with a 360 degree arc of calcium in the mid vessel with an MLD of 2 mm however the full vessel size based on EEM and appear to be closer to 3 mm.  Then orbital atherectomy was performed at low speeds on the mid LAD.  LAD was then dilated with a 2.25 mm balloon and the LAD was then stented with a Abbott Xience Fanny 2.25 x 38 mm drug-eluting stent.  Stents were postdilated with a 2.5 mm noncompliant balloon followed by a 3 mm noncompliant balloon. There was 0% residual stenosis.  There was RAFY 3 flow before and after PCI.        Procedure was felt to be complete at this juncture and equipment was removed including the temporary transvenous pacer.  Venous site was secured for later removal of that sheath     CONCLUSIONS:      Moderate RCA disease in the proximal vessel with negative IFR  Successful PCI of the LAD with orbital atherectomy and single drug-eluting stent     Medical therapy for remaining CAD/ASHD      NYHA:   II  ACC/ AHA Stage:    B    Pertinent Problems:  ~CAD; s/p [facility] 10/3/19 showing multi-vessel disease; s/p PCI of the LAD with orbital athrectomy and LUI 12/4/19  ~Hx of acute inferior STEMI 10/2019  ~HTN  ~Ischemic cardiomyopathy. LVEF 45-50%--> 50-55%  ~HLD  ~Dm       Visit Diagnosis:    1. Ischemic cardiomyopathy    2. Coronary artery disease involving native coronary artery of native heart without angina pectoris    3. Essential hypertension        1. Continue aspirin and plavix for now  2. Continue statin  3. Continue coreg 6.25mg twice a day   4. Continue to stay as active as possible- discussed cardiac rehab  5. Follow up with the [facility] as planned- for prescription coverage  6. Follow up with Dr. Ankit Solitario 4 months to evaluate if any additional intervention needed      QUALITY MEASURES  1. Tobacco Cessation Counseling: NA  2. Retake of BP if >140/90:   NA  3. Documentation to PCP/referring for new patient:  Sent to PCP at close of office visit  4. CAD patient on anti-platelet: Yes  5. CAD patient on STATIN therapy:  Yes  6. Patient with CHF and aFib on anticoagulation:  NA     I appreciate the opportunity for caring for this patient.      Leona Madison, CNP, 1/2/2020, 9:28 AM

## 2021-03-24 ENCOUNTER — HOSPITAL ENCOUNTER (OUTPATIENT)
Dept: CARDIAC REHAB | Age: 66
Setting detail: THERAPIES SERIES
Discharge: HOME | End: 2021-03-24
Payer: OTHER GOVERNMENT

## 2021-03-29 ENCOUNTER — APPOINTMENT (OUTPATIENT)
Dept: CARDIAC REHAB | Age: 66
End: 2021-03-29
Payer: OTHER GOVERNMENT

## 2021-03-31 ENCOUNTER — HOSPITAL ENCOUNTER (OUTPATIENT)
Dept: CARDIAC REHAB | Age: 66
Setting detail: THERAPIES SERIES
Discharge: HOME | End: 2021-03-31
Payer: OTHER GOVERNMENT

## 2021-03-31 PROCEDURE — 93798 PHYS/QHP OP CAR RHAB W/ECG: CPT

## 2021-04-01 LAB
GLUCOSE BLD-MCNC: 218 MG/DL (ref 70–99)
GLUCOSE BLD-MCNC: 248 MG/DL (ref 70–99)
PERFORMED ON: ABNORMAL
PERFORMED ON: ABNORMAL

## 2021-04-02 ENCOUNTER — HOSPITAL ENCOUNTER (OUTPATIENT)
Dept: CARDIAC REHAB | Age: 66
Setting detail: THERAPIES SERIES
Discharge: HOME | End: 2021-04-02
Payer: OTHER GOVERNMENT

## 2021-04-02 PROCEDURE — 93798 PHYS/QHP OP CAR RHAB W/ECG: CPT

## 2021-04-05 ENCOUNTER — HOSPITAL ENCOUNTER (OUTPATIENT)
Dept: CARDIAC REHAB | Age: 66
Setting detail: THERAPIES SERIES
Discharge: HOME | End: 2021-04-05
Payer: OTHER GOVERNMENT

## 2021-04-05 LAB
GLUCOSE BLD-MCNC: 288 MG/DL (ref 70–99)
PERFORMED ON: ABNORMAL

## 2021-04-05 PROCEDURE — 93798 PHYS/QHP OP CAR RHAB W/ECG: CPT

## 2021-04-07 ENCOUNTER — HOSPITAL ENCOUNTER (OUTPATIENT)
Dept: CARDIAC REHAB | Age: 66
Setting detail: THERAPIES SERIES
Discharge: HOME | End: 2021-04-07
Payer: OTHER GOVERNMENT

## 2021-04-07 PROCEDURE — 93798 PHYS/QHP OP CAR RHAB W/ECG: CPT

## 2021-04-09 ENCOUNTER — HOSPITAL ENCOUNTER (OUTPATIENT)
Dept: CARDIAC REHAB | Age: 66
Setting detail: THERAPIES SERIES
Discharge: HOME | End: 2021-04-09
Payer: OTHER GOVERNMENT

## 2021-04-09 PROCEDURE — 93798 PHYS/QHP OP CAR RHAB W/ECG: CPT

## 2021-04-12 ENCOUNTER — HOSPITAL ENCOUNTER (OUTPATIENT)
Dept: CARDIAC REHAB | Age: 66
Setting detail: THERAPIES SERIES
Discharge: HOME | End: 2021-04-12
Payer: OTHER GOVERNMENT

## 2021-04-12 PROCEDURE — 93798 PHYS/QHP OP CAR RHAB W/ECG: CPT

## 2021-04-14 ENCOUNTER — HOSPITAL ENCOUNTER (OUTPATIENT)
Dept: CARDIAC REHAB | Age: 66
Setting detail: THERAPIES SERIES
Discharge: HOME | End: 2021-04-14
Payer: OTHER GOVERNMENT

## 2021-04-14 PROCEDURE — 93798 PHYS/QHP OP CAR RHAB W/ECG: CPT

## 2021-04-16 ENCOUNTER — HOSPITAL ENCOUNTER (OUTPATIENT)
Dept: CARDIAC REHAB | Age: 66
Setting detail: THERAPIES SERIES
Discharge: HOME | End: 2021-04-16
Payer: OTHER GOVERNMENT

## 2021-04-16 PROCEDURE — 93798 PHYS/QHP OP CAR RHAB W/ECG: CPT

## 2021-04-19 ENCOUNTER — HOSPITAL ENCOUNTER (OUTPATIENT)
Dept: CARDIAC REHAB | Age: 66
Setting detail: THERAPIES SERIES
Discharge: HOME | End: 2021-04-19
Payer: OTHER GOVERNMENT

## 2021-04-19 PROCEDURE — 93798 PHYS/QHP OP CAR RHAB W/ECG: CPT

## 2021-04-21 ENCOUNTER — HOSPITAL ENCOUNTER (OUTPATIENT)
Dept: CARDIAC REHAB | Age: 66
Setting detail: THERAPIES SERIES
Discharge: HOME | End: 2021-04-21
Payer: OTHER GOVERNMENT

## 2021-04-21 PROCEDURE — 93798 PHYS/QHP OP CAR RHAB W/ECG: CPT

## 2021-04-23 ENCOUNTER — HOSPITAL ENCOUNTER (OUTPATIENT)
Dept: CARDIAC REHAB | Age: 66
Setting detail: THERAPIES SERIES
Discharge: HOME | End: 2021-04-23
Payer: OTHER GOVERNMENT

## 2021-04-23 PROCEDURE — 93798 PHYS/QHP OP CAR RHAB W/ECG: CPT

## 2021-04-28 ENCOUNTER — HOSPITAL ENCOUNTER (OUTPATIENT)
Dept: CARDIAC REHAB | Age: 66
Setting detail: THERAPIES SERIES
Discharge: HOME | End: 2021-04-28
Payer: OTHER GOVERNMENT

## 2021-04-28 PROCEDURE — 93798 PHYS/QHP OP CAR RHAB W/ECG: CPT

## 2021-04-30 ENCOUNTER — HOSPITAL ENCOUNTER (OUTPATIENT)
Dept: CARDIAC REHAB | Age: 66
Setting detail: THERAPIES SERIES
Discharge: HOME | End: 2021-04-30
Payer: OTHER GOVERNMENT

## 2021-04-30 PROCEDURE — 93798 PHYS/QHP OP CAR RHAB W/ECG: CPT

## 2021-05-05 ENCOUNTER — HOSPITAL ENCOUNTER (OUTPATIENT)
Dept: CARDIAC REHAB | Age: 66
Setting detail: THERAPIES SERIES
Discharge: HOME | End: 2021-05-05
Payer: OTHER GOVERNMENT

## 2021-05-05 PROCEDURE — 93798 PHYS/QHP OP CAR RHAB W/ECG: CPT

## 2021-05-07 ENCOUNTER — HOSPITAL ENCOUNTER (OUTPATIENT)
Dept: CARDIAC REHAB | Age: 66
Setting detail: THERAPIES SERIES
Discharge: HOME | End: 2021-05-07
Payer: OTHER GOVERNMENT

## 2021-05-07 PROCEDURE — 93798 PHYS/QHP OP CAR RHAB W/ECG: CPT

## 2021-05-12 ENCOUNTER — HOSPITAL ENCOUNTER (OUTPATIENT)
Dept: CARDIAC REHAB | Age: 66
Setting detail: THERAPIES SERIES
Discharge: HOME | End: 2021-05-12
Payer: OTHER GOVERNMENT

## 2021-05-12 PROCEDURE — 93798 PHYS/QHP OP CAR RHAB W/ECG: CPT

## 2021-05-14 ENCOUNTER — HOSPITAL ENCOUNTER (OUTPATIENT)
Dept: CARDIAC REHAB | Age: 66
Setting detail: THERAPIES SERIES
Discharge: HOME | End: 2021-05-14
Payer: OTHER GOVERNMENT

## 2021-05-14 PROCEDURE — 93798 PHYS/QHP OP CAR RHAB W/ECG: CPT

## 2021-05-17 ENCOUNTER — HOSPITAL ENCOUNTER (OUTPATIENT)
Dept: CARDIAC REHAB | Age: 66
Setting detail: THERAPIES SERIES
Discharge: HOME | End: 2021-05-17
Payer: OTHER GOVERNMENT

## 2021-05-17 PROCEDURE — 93798 PHYS/QHP OP CAR RHAB W/ECG: CPT

## 2021-05-19 ENCOUNTER — HOSPITAL ENCOUNTER (OUTPATIENT)
Dept: CARDIAC REHAB | Age: 66
Setting detail: THERAPIES SERIES
Discharge: HOME | End: 2021-05-19
Payer: OTHER GOVERNMENT

## 2021-05-19 PROCEDURE — 93798 PHYS/QHP OP CAR RHAB W/ECG: CPT

## 2021-05-21 ENCOUNTER — HOSPITAL ENCOUNTER (OUTPATIENT)
Dept: CARDIAC REHAB | Age: 66
Setting detail: THERAPIES SERIES
Discharge: HOME | End: 2021-05-21
Payer: OTHER GOVERNMENT

## 2021-05-21 PROCEDURE — 93798 PHYS/QHP OP CAR RHAB W/ECG: CPT

## 2021-05-24 ENCOUNTER — HOSPITAL ENCOUNTER (OUTPATIENT)
Dept: CARDIAC REHAB | Age: 66
Setting detail: THERAPIES SERIES
Discharge: HOME | End: 2021-05-24
Payer: OTHER GOVERNMENT

## 2021-05-24 PROCEDURE — 93798 PHYS/QHP OP CAR RHAB W/ECG: CPT

## 2021-05-28 ENCOUNTER — HOSPITAL ENCOUNTER (OUTPATIENT)
Dept: CARDIAC REHAB | Age: 66
Setting detail: THERAPIES SERIES
Discharge: HOME | End: 2021-05-28
Payer: OTHER GOVERNMENT

## 2021-05-28 PROCEDURE — 93798 PHYS/QHP OP CAR RHAB W/ECG: CPT

## 2021-06-02 ENCOUNTER — HOSPITAL ENCOUNTER (OUTPATIENT)
Dept: CARDIAC REHAB | Age: 66
Setting detail: THERAPIES SERIES
Discharge: HOME | End: 2021-06-02
Payer: OTHER GOVERNMENT

## 2021-06-02 PROCEDURE — 93798 PHYS/QHP OP CAR RHAB W/ECG: CPT

## 2021-06-04 ENCOUNTER — HOSPITAL ENCOUNTER (OUTPATIENT)
Dept: CARDIAC REHAB | Age: 66
Setting detail: THERAPIES SERIES
Discharge: HOME | End: 2021-06-04
Payer: OTHER GOVERNMENT

## 2021-06-04 PROCEDURE — 93798 PHYS/QHP OP CAR RHAB W/ECG: CPT

## 2021-06-07 ENCOUNTER — HOSPITAL ENCOUNTER (OUTPATIENT)
Dept: CARDIAC REHAB | Age: 66
Setting detail: THERAPIES SERIES
Discharge: HOME | End: 2021-06-07
Payer: OTHER GOVERNMENT

## 2021-06-07 PROCEDURE — 93798 PHYS/QHP OP CAR RHAB W/ECG: CPT

## 2021-06-09 ENCOUNTER — HOSPITAL ENCOUNTER (OUTPATIENT)
Dept: CARDIAC REHAB | Age: 66
Setting detail: THERAPIES SERIES
Discharge: HOME | End: 2021-06-09
Payer: OTHER GOVERNMENT

## 2021-06-09 PROCEDURE — 93798 PHYS/QHP OP CAR RHAB W/ECG: CPT

## 2021-06-11 ENCOUNTER — HOSPITAL ENCOUNTER (OUTPATIENT)
Dept: CARDIAC REHAB | Age: 66
Setting detail: THERAPIES SERIES
Discharge: HOME | End: 2021-06-11
Payer: OTHER GOVERNMENT

## 2021-06-11 PROCEDURE — 93798 PHYS/QHP OP CAR RHAB W/ECG: CPT

## 2021-06-16 ENCOUNTER — HOSPITAL ENCOUNTER (OUTPATIENT)
Dept: CARDIAC REHAB | Age: 66
Setting detail: THERAPIES SERIES
Discharge: HOME | End: 2021-06-16
Payer: OTHER GOVERNMENT

## 2021-06-16 PROCEDURE — 93798 PHYS/QHP OP CAR RHAB W/ECG: CPT

## 2021-06-18 ENCOUNTER — HOSPITAL ENCOUNTER (OUTPATIENT)
Dept: CARDIAC REHAB | Age: 66
Setting detail: THERAPIES SERIES
Discharge: HOME | End: 2021-06-18
Payer: OTHER GOVERNMENT

## 2021-06-18 PROCEDURE — 93798 PHYS/QHP OP CAR RHAB W/ECG: CPT

## 2021-06-21 ENCOUNTER — HOSPITAL ENCOUNTER (OUTPATIENT)
Dept: CARDIAC REHAB | Age: 66
Setting detail: THERAPIES SERIES
Discharge: HOME | End: 2021-06-21
Payer: OTHER GOVERNMENT

## 2021-06-21 PROCEDURE — 93798 PHYS/QHP OP CAR RHAB W/ECG: CPT

## 2021-06-23 ENCOUNTER — HOSPITAL ENCOUNTER (OUTPATIENT)
Dept: CARDIAC REHAB | Age: 66
Setting detail: THERAPIES SERIES
Discharge: HOME | End: 2021-06-23
Payer: OTHER GOVERNMENT

## 2021-06-23 PROCEDURE — 93798 PHYS/QHP OP CAR RHAB W/ECG: CPT

## 2021-06-25 ENCOUNTER — HOSPITAL ENCOUNTER (OUTPATIENT)
Dept: CARDIAC REHAB | Age: 66
Setting detail: THERAPIES SERIES
Discharge: HOME | End: 2021-06-25
Payer: OTHER GOVERNMENT

## 2021-06-25 PROCEDURE — 93798 PHYS/QHP OP CAR RHAB W/ECG: CPT

## 2021-06-28 ENCOUNTER — HOSPITAL ENCOUNTER (OUTPATIENT)
Dept: CARDIAC REHAB | Age: 66
Setting detail: THERAPIES SERIES
Discharge: HOME | End: 2021-06-28
Payer: OTHER GOVERNMENT

## 2021-06-28 PROCEDURE — 93798 PHYS/QHP OP CAR RHAB W/ECG: CPT

## 2021-06-30 ENCOUNTER — HOSPITAL ENCOUNTER (OUTPATIENT)
Dept: CARDIAC REHAB | Age: 66
Setting detail: THERAPIES SERIES
Discharge: HOME | End: 2021-06-30
Payer: OTHER GOVERNMENT

## 2021-06-30 PROCEDURE — 93798 PHYS/QHP OP CAR RHAB W/ECG: CPT

## 2021-07-02 ENCOUNTER — HOSPITAL ENCOUNTER (OUTPATIENT)
Dept: CARDIAC REHAB | Age: 66
Setting detail: THERAPIES SERIES
Discharge: HOME | End: 2021-07-02
Payer: MEDICARE

## 2021-07-02 PROCEDURE — 93798 PHYS/QHP OP CAR RHAB W/ECG: CPT

## 2021-07-05 ENCOUNTER — APPOINTMENT (OUTPATIENT)
Dept: CARDIAC REHAB | Age: 66
End: 2021-07-05
Payer: MEDICARE

## 2021-07-07 ENCOUNTER — HOSPITAL ENCOUNTER (OUTPATIENT)
Dept: CARDIAC REHAB | Age: 66
Setting detail: THERAPIES SERIES
Discharge: HOME | End: 2021-07-07
Payer: MEDICARE

## 2021-07-07 PROCEDURE — 93798 PHYS/QHP OP CAR RHAB W/ECG: CPT

## 2021-07-09 ENCOUNTER — APPOINTMENT (OUTPATIENT)
Dept: CARDIAC REHAB | Age: 66
End: 2021-07-09
Payer: MEDICARE

## 2021-07-12 ENCOUNTER — APPOINTMENT (OUTPATIENT)
Dept: CARDIAC REHAB | Age: 66
End: 2021-07-12
Payer: MEDICARE

## 2021-07-14 ENCOUNTER — APPOINTMENT (OUTPATIENT)
Dept: CARDIAC REHAB | Age: 66
End: 2021-07-14
Payer: MEDICARE

## 2021-07-16 ENCOUNTER — APPOINTMENT (OUTPATIENT)
Dept: CARDIAC REHAB | Age: 66
End: 2021-07-16
Payer: MEDICARE

## 2021-07-19 ENCOUNTER — APPOINTMENT (OUTPATIENT)
Dept: CARDIAC REHAB | Age: 66
End: 2021-07-19
Payer: MEDICARE

## 2021-07-21 ENCOUNTER — APPOINTMENT (OUTPATIENT)
Dept: CARDIAC REHAB | Age: 66
End: 2021-07-21
Payer: MEDICARE

## 2021-07-23 ENCOUNTER — APPOINTMENT (OUTPATIENT)
Dept: CARDIAC REHAB | Age: 66
End: 2021-07-23
Payer: MEDICARE

## 2021-07-26 ENCOUNTER — APPOINTMENT (OUTPATIENT)
Dept: CARDIAC REHAB | Age: 66
End: 2021-07-26
Payer: MEDICARE

## 2021-07-28 ENCOUNTER — APPOINTMENT (OUTPATIENT)
Dept: CARDIAC REHAB | Age: 66
End: 2021-07-28
Payer: MEDICARE

## 2021-07-30 ENCOUNTER — APPOINTMENT (OUTPATIENT)
Dept: CARDIAC REHAB | Age: 66
End: 2021-07-30
Payer: MEDICARE

## 2021-08-29 ENCOUNTER — HOSPITAL ENCOUNTER (EMERGENCY)
Age: 66
Discharge: HOME | End: 2021-08-30
Attending: EMERGENCY MEDICINE
Payer: MEDICARE

## 2021-08-29 ENCOUNTER — APPOINTMENT (OUTPATIENT)
Dept: CT IMAGING | Age: 66
End: 2021-08-29
Payer: MEDICARE

## 2021-08-29 DIAGNOSIS — R10.84 GENERALIZED ABDOMINAL PAIN: Primary | ICD-10-CM

## 2021-08-29 LAB
A/G RATIO: 1.4 (ref 1.1–2.2)
ALBUMIN SERPL-MCNC: 4.1 G/DL (ref 3.4–5)
ALP BLD-CCNC: 94 U/L (ref 40–129)
ALT SERPL-CCNC: 31 U/L (ref 10–40)
ANION GAP SERPL CALCULATED.3IONS-SCNC: 14 MMOL/L (ref 3–16)
AST SERPL-CCNC: 70 U/L (ref 15–37)
BASOPHILS ABSOLUTE: 0 K/UL (ref 0–0.2)
BASOPHILS RELATIVE PERCENT: 0.1 %
BILIRUB SERPL-MCNC: 1.3 MG/DL (ref 0–1)
BILIRUBIN URINE: NEGATIVE
BLOOD, URINE: NEGATIVE
BUN BLDV-MCNC: 18 MG/DL (ref 7–20)
CALCIUM SERPL-MCNC: 8.9 MG/DL (ref 8.3–10.6)
CHLORIDE BLD-SCNC: 101 MMOL/L (ref 99–110)
CLARITY: CLEAR
CO2: 21 MMOL/L (ref 21–32)
COLOR: ABNORMAL
CREAT SERPL-MCNC: 1.2 MG/DL (ref 0.8–1.3)
EOSINOPHILS ABSOLUTE: 0.1 K/UL (ref 0–0.6)
EOSINOPHILS RELATIVE PERCENT: 1.4 %
GFR AFRICAN AMERICAN: >60
GFR NON-AFRICAN AMERICAN: >60
GLOBULIN: 2.9 G/DL
GLUCOSE BLD-MCNC: 228 MG/DL (ref 70–99)
GLUCOSE URINE: >=1000 MG/DL
HCT VFR BLD CALC: 44.1 % (ref 40.5–52.5)
HEMOGLOBIN: 15.7 G/DL (ref 13.5–17.5)
KETONES, URINE: NEGATIVE MG/DL
LEUKOCYTE ESTERASE, URINE: NEGATIVE
LIPASE: 128 U/L (ref 13–60)
LYMPHOCYTES ABSOLUTE: 1 K/UL (ref 1–5.1)
LYMPHOCYTES RELATIVE PERCENT: 9.8 %
MCH RBC QN AUTO: 31.5 PG (ref 26–34)
MCHC RBC AUTO-ENTMCNC: 35.6 G/DL (ref 31–36)
MCV RBC AUTO: 88.5 FL (ref 80–100)
MICROSCOPIC EXAMINATION: ABNORMAL
MONOCYTES ABSOLUTE: 0.8 K/UL (ref 0–1.3)
MONOCYTES RELATIVE PERCENT: 7.6 %
NEUTROPHILS ABSOLUTE: 8.6 K/UL (ref 1.7–7.7)
NEUTROPHILS RELATIVE PERCENT: 81.1 %
NITRITE, URINE: NEGATIVE
PDW BLD-RTO: 14 % (ref 12.4–15.4)
PH UA: 5.5 (ref 5–8)
PLATELET # BLD: 160 K/UL (ref 135–450)
PMV BLD AUTO: 6.9 FL (ref 5–10.5)
POTASSIUM REFLEX MAGNESIUM: 4.7 MMOL/L (ref 3.5–5.1)
PROTEIN UA: NEGATIVE MG/DL
RBC # BLD: 4.98 M/UL (ref 4.2–5.9)
SODIUM BLD-SCNC: 136 MMOL/L (ref 136–145)
SPECIFIC GRAVITY UA: 1.01 (ref 1–1.03)
TOTAL PROTEIN: 7 G/DL (ref 6.4–8.2)
URINE REFLEX TO CULTURE: ABNORMAL
URINE TYPE: ABNORMAL
UROBILINOGEN, URINE: 0.2 E.U./DL
WBC # BLD: 10.6 K/UL (ref 4–11)

## 2021-08-29 PROCEDURE — 80053 COMPREHEN METABOLIC PANEL: CPT

## 2021-08-29 PROCEDURE — 83690 ASSAY OF LIPASE: CPT

## 2021-08-29 PROCEDURE — 6360000002 HC RX W HCPCS: Performed by: EMERGENCY MEDICINE

## 2021-08-29 PROCEDURE — 96375 TX/PRO/DX INJ NEW DRUG ADDON: CPT

## 2021-08-29 PROCEDURE — 2580000003 HC RX 258: Performed by: EMERGENCY MEDICINE

## 2021-08-29 PROCEDURE — 74177 CT ABD & PELVIS W/CONTRAST: CPT

## 2021-08-29 PROCEDURE — 6360000004 HC RX CONTRAST MEDICATION: Performed by: EMERGENCY MEDICINE

## 2021-08-29 PROCEDURE — 93005 ELECTROCARDIOGRAM TRACING: CPT | Performed by: EMERGENCY MEDICINE

## 2021-08-29 PROCEDURE — 99285 EMERGENCY DEPT VISIT HI MDM: CPT

## 2021-08-29 PROCEDURE — 96374 THER/PROPH/DIAG INJ IV PUSH: CPT

## 2021-08-29 PROCEDURE — 85025 COMPLETE CBC W/AUTO DIFF WBC: CPT

## 2021-08-29 PROCEDURE — 81003 URINALYSIS AUTO W/O SCOPE: CPT

## 2021-08-29 RX ORDER — KETOROLAC TROMETHAMINE 30 MG/ML
15 INJECTION, SOLUTION INTRAMUSCULAR; INTRAVENOUS ONCE
Status: COMPLETED | OUTPATIENT
Start: 2021-08-29 | End: 2021-08-29

## 2021-08-29 RX ORDER — ONDANSETRON 2 MG/ML
4 INJECTION INTRAMUSCULAR; INTRAVENOUS ONCE
Status: COMPLETED | OUTPATIENT
Start: 2021-08-29 | End: 2021-08-29

## 2021-08-29 RX ORDER — DICYCLOMINE HYDROCHLORIDE 10 MG/1
10 CAPSULE ORAL EVERY 6 HOURS PRN
Qty: 20 CAPSULE | Refills: 0 | Status: ON HOLD | OUTPATIENT
Start: 2021-08-29 | End: 2022-07-24

## 2021-08-29 RX ORDER — 0.9 % SODIUM CHLORIDE 0.9 %
500 INTRAVENOUS SOLUTION INTRAVENOUS ONCE
Status: COMPLETED | OUTPATIENT
Start: 2021-08-29 | End: 2021-08-29

## 2021-08-29 RX ORDER — ONDANSETRON 4 MG/1
4 TABLET, ORALLY DISINTEGRATING ORAL EVERY 8 HOURS PRN
Qty: 20 TABLET | Refills: 0 | Status: SHIPPED | OUTPATIENT
Start: 2021-08-29

## 2021-08-29 RX ORDER — AMOXICILLIN AND CLAVULANATE POTASSIUM 875; 125 MG/1; MG/1
1 TABLET, FILM COATED ORAL 2 TIMES DAILY
Qty: 20 TABLET | Refills: 0 | Status: SHIPPED | OUTPATIENT
Start: 2021-08-29 | End: 2021-09-08

## 2021-08-29 RX ADMIN — SODIUM CHLORIDE 500 ML: 9 INJECTION, SOLUTION INTRAVENOUS at 21:27

## 2021-08-29 RX ADMIN — IOPAMIDOL 75 ML: 755 INJECTION, SOLUTION INTRAVENOUS at 22:15

## 2021-08-29 RX ADMIN — KETOROLAC TROMETHAMINE 15 MG: 30 INJECTION, SOLUTION INTRAMUSCULAR at 21:30

## 2021-08-29 RX ADMIN — ONDANSETRON 4 MG: 2 INJECTION INTRAMUSCULAR; INTRAVENOUS at 21:29

## 2021-08-29 ASSESSMENT — ENCOUNTER SYMPTOMS
VOMITING: 0
BACK PAIN: 0
RHINORRHEA: 0
NAUSEA: 1
WHEEZING: 0
ABDOMINAL PAIN: 1
SHORTNESS OF BREATH: 0
ANAL BLEEDING: 0
PHOTOPHOBIA: 0
BLOOD IN STOOL: 0
DIARRHEA: 1
COLOR CHANGE: 0

## 2021-08-29 ASSESSMENT — PAIN DESCRIPTION - PAIN TYPE: TYPE: ACUTE PAIN

## 2021-08-29 ASSESSMENT — PAIN SCALES - GENERAL
PAINLEVEL_OUTOF10: 2
PAINLEVEL_OUTOF10: 4
PAINLEVEL_OUTOF10: 4

## 2021-08-29 ASSESSMENT — PAIN DESCRIPTION - LOCATION: LOCATION: ABDOMEN

## 2021-08-29 ASSESSMENT — PAIN DESCRIPTION - PROGRESSION: CLINICAL_PROGRESSION: GRADUALLY IMPROVING

## 2021-08-30 VITALS
SYSTOLIC BLOOD PRESSURE: 107 MMHG | DIASTOLIC BLOOD PRESSURE: 74 MMHG | RESPIRATION RATE: 16 BRPM | WEIGHT: 199 LBS | HEIGHT: 72 IN | TEMPERATURE: 98.4 F | HEART RATE: 74 BPM | BODY MASS INDEX: 26.95 KG/M2 | OXYGEN SATURATION: 97 %

## 2021-08-30 LAB
EKG ATRIAL RATE: 83 BPM
EKG DIAGNOSIS: NORMAL
EKG P AXIS: 2 DEGREES
EKG P-R INTERVAL: 118 MS
EKG Q-T INTERVAL: 356 MS
EKG QRS DURATION: 84 MS
EKG QTC CALCULATION (BAZETT): 418 MS
EKG R AXIS: 53 DEGREES
EKG T AXIS: 17 DEGREES
EKG VENTRICULAR RATE: 83 BPM

## 2021-08-30 PROCEDURE — 93010 ELECTROCARDIOGRAM REPORT: CPT | Performed by: INTERNAL MEDICINE

## 2021-08-30 NOTE — ED PROVIDER NOTES
[address] [facility]  ED  [facility]      Pt Name: Demetri Ndiaye  MRN: [id number]  [place] 1955  Date ofevaluation: 8/29/2021  Provider: Maurizio Beauchamp MD    CHIEF COMPLAINT       Chief Complaint   Patient presents with    Abdominal Pain     starting yesterday, worsening today. family hx of diverticulitis. pt reports diarrhea and nausea. denies any blood in stool         HISTORY OF PRESENT ILLNESS   (Location/Symptom, Timing/Onset,Context/Setting, Quality, Duration, Modifying Factors, Severity)  Note limiting factors. Demetri Ndiaye is a 74 y.o. male  who  has a past medical history of Diabetes mellitus ([facility] [address] Hyperlipidemia, Hypertension, and MI (myocardial infarction) ([facility] [address] who presents to the emergency department for evaluation of abdominal pain. Patient reports 2-day history of generalized abdominal pain with associated nausea and diarrhea. He states that initially had loose stools which has since transitioned into very watery stools. Denies fevers hematochezia or melena. He denies changes in urine function. Denies a history of previous abdominal surgeries he states he does have a family history of diverticulitis and spoke to his brother who states he had similar symptoms. . Patient has any recent travel or antibiotic use. Denies any changes in medications or new exposures. Denies chest pains or shortness of breath. He denies a history of previous symptoms . HPI    NursingNotes were reviewed. REVIEW OF SYSTEMS    (2-9 systems for level 4, 10 or more for level 5)     Review of Systems   Constitutional: Negative for activity change, chills and fever. HENT: Negative for congestion and rhinorrhea. Eyes: Negative for photophobia and visual disturbance. Respiratory: Negative for shortness of breath and wheezing. Cardiovascular: Negative for palpitations and leg swelling. Gastrointestinal: Positive for abdominal pain, diarrhea and nausea.  Negative for anal bleeding, blood in stool and vomiting. Endocrine: Negative for polydipsia and polyuria. Genitourinary: Negative for difficulty urinating and frequency. Musculoskeletal: Negative for back pain and gait problem. Skin: Negative for color change and rash. Neurological: Negative for light-headedness and headaches. Psychiatric/Behavioral: Negative for confusion. The patient is not nervous/anxious. All other systems reviewed and are negative. Except as noted above the remainder of the review of systems was reviewed and negative. PAST MEDICAL HISTORY     Past Medical History:   Diagnosis Date    Diabetes mellitus ([facility] [address]     Hyperlipidemia     Hypertension     MI (myocardial infarction) ([facility] [address]          SURGICALHISTORY       Past Surgical History:   Procedure Laterality Date    APPENDECTOMY      CORONARY ANGIOPLASTY WITH STENT PLACEMENT      MUSCLE REPAIR      SHOULDER SURGERY           CURRENT MEDICATIONS       Previous Medications    ALOGLIPTIN (NESINA) 25 MG TABS TABLET    Take 25 mg by mouth daily    ASPIRIN 81 MG CHEWABLE TABLET    Take 1 tablet by mouth daily    ATORVASTATIN (LIPITOR) 80 MG TABLET    Take 1 tablet by mouth nightly    BACLOFEN (LIORESAL) 10 MG TABLET    Take 10 mg by mouth 3 times daily as needed (as needed for muscle spasms)    CARVEDILOL (COREG) 6.25 MG TABLET    Take 1 tablet by mouth 2 times daily (with meals)    CLOPIDOGREL (PLAVIX) 75 MG TABLET    Take 75 mg by mouth daily    GABAPENTIN (NEURONTIN) 600 MG TABLET    Take 600 mg by mouth 2 times daily. GLIPIZIDE (GLUCOTROL) 10 MG TABLET    Take 5 mg by mouth 2 times daily (before meals)    LISINOPRIL (PRINIVIL;ZESTRIL) 5 MG TABLET    Take 1 tablet by mouth daily    METFORMIN (GLUCOPHAGE) 1000 MG TABLET    Take 1,000 mg by mouth Daily with lunch    NITROGLYCERIN (NITROSTAT) 0.4 MG SL TABLET    Place 1 tablet under the tongue every 5 minutes as needed for Chest pain up to max of 3 total doses.  If no relief after 1 dose, call 911. TAMSULOSIN (FLOMAX) 0.4 MG CAPSULE    Take 0.4 mg by mouth daily            Ciprofloxacin    FAMILY HISTORY     History reviewed. No pertinent family history. SOCIAL HISTORY       Social History     Socioeconomic History    Marital status:      Spouse name: None    Number of children: None    Years of education: None    Highest education level: None   Occupational History    None   Tobacco Use    Smoking status: Former Smoker    Smokeless tobacco: Never Used   Substance and Sexual Activity    Alcohol use: Yes     Comment: rarely    Drug use: No    Sexual activity: None   Other Topics Concern    None   Social History Narrative    None     Social Determinants of Health     Financial Resource Strain:     Difficulty of Paying Living Expenses:    Food Insecurity:     Worried About Running Out of Food in the Last Year:     Ran Out of Food in the Last Year:    Transportation Needs:     Lack of Transportation (Medical):  Lack of Transportation (Non-Medical):    Physical Activity:     Days of Exercise per Week:     Minutes of Exercise per Session:    Stress:     Feeling of Stress :    Social Connections:     Frequency of Communication with Friends and Family:     Frequency of Social Gatherings with Friends and Family:     Attends Shinto Services:     Active Member of Clubs or Organizations:     Attends Club or Organization Meetings:     Marital Status:    Intimate Partner Violence:     Fear of Current or Ex-Partner:     Emotionally Abused:     Physically Abused:     Sexually Abused:        SCREENINGS             PHYSICAL EXAM    (up to 7 for level 4, 8 or more for level 5)     ED Triage Vitals [08/29/21 1911]   BP Temp Temp Source Pulse Resp SpO2 Height Weight   121/74 98.3 °F (36.8 °C) Oral 86 18 98 % 6' (1.829 m) 199 lb (90.3 kg)       Physical Exam  Vitals and nursing note reviewed. Constitutional:       General: He is not in acute distress. Appearance: He is well-developed. HENT:      Head: Normocephalic and atraumatic. Eyes:      Conjunctiva/sclera: Conjunctivae normal.   Neck:      Trachea: No tracheal deviation. Cardiovascular:      Rate and Rhythm: Normal rate and regular rhythm. Pulmonary:      Effort: Pulmonary effort is normal.      Breath sounds: Normal breath sounds. No wheezing or rales. Abdominal:      General: There is no distension. Palpations: Abdomen is soft. Tenderness: There is generalized abdominal tenderness. There is no right CVA tenderness, left CVA tenderness, guarding or rebound. Musculoskeletal:         General: No deformity. Normal range of motion. Cervical back: Normal range of motion. Skin:     General: Skin is warm and dry. Capillary Refill: Capillary refill takes less than 2 seconds. Neurological:      Mental Status: He is alert and oriented to person, place, and time. RESULTS     EKG: All EKG's are interpreted by the Emergency Department Physician who either signs or Co-signsthis chart in the absence of a cardiologist.    EKG shows sinus rhythm with ventricular rate of 83 bpm.  OH interval and QTc interval within normal limits. Patient has normal axis. There are no sniffing and ST elevations depressions EKG is nondiagnostic for ACS. Paired EKG from 12/5/2019 I did not appreciate significant changes.     RADIOLOGY:   Non-plain filmimages such as CT, Ultrasound and MRI are read by the radiologist. Plain radiographic images are visualized and preliminarily interpreted by the emergency physician with the below findings:      Interpretation per the Radiologist below, if available at the time ofthis note:    CT ABDOMEN PELVIS W IV CONTRAST Additional Contrast? None    (Results Pending)         ED BEDSIDE ULTRASOUND:   Performed by ED Physician - none    LABS:  Labs Reviewed   CBC WITH AUTO DIFFERENTIAL - Abnormal; Notable for the following components:       Result Value Neutrophils Absolute 8.6 (*)     All other components within normal limits    Narrative:     Performed at:  [address], [facility] [employer]   Phone [phone]   COMPREHENSIVE METABOLIC PANEL W/ REFLEX TO MG FOR LOW K - Abnormal; Notable for the following components:    Glucose 228 (*)     Total Bilirubin 1.3 (*)     AST 70 (*)     All other components within normal limits    Narrative:     Performed at:  [address], [facility] [employer]   Phone [phone]   LIPASE - Abnormal; Notable for the following components:    Lipase 128.0 (*)     All other components within normal limits    Narrative:     Performed at:  [address], [facility] [employer]   Phone [phone]   URINE RT REFLEX TO CULTURE       All other labs were within normal range or not returned as of this dictation. EMERGENCY DEPARTMENT COURSE and DIFFERENTIAL DIAGNOSIS/MDM:   Vitals:    Vitals:    08/29/21 1911 08/29/21 2132   BP: 121/74 112/67   Pulse: 86 81   Resp: 18 14   Temp: 98.3 °F (36.8 °C)    TempSrc: Oral    SpO2: 98% 95%   Weight: 199 lb (90.3 kg)    Height: 6' (1.829 m)        Patient was given thefollowing medications:  Medications   0.9 % sodium chloride bolus (500 mLs IntraVENous New Bag 8/29/21 2127)   ondansetron (ZOFRAN) injection 4 mg (4 mg IntraVENous Given 8/29/21 2129)   ketorolac (TORADOL) injection 15 mg (15 mg IntraVENous Given 8/29/21 2130)       ED COURSE & MEDICAL DECISION MAKING    Pertinent Labs & Imaging studies reviewed. (See chart for details)   -  Patient seen and evaluated in the emergency department. -  Triage and nursing notes reviewed and incorporated. -  Old chart records reviewed and incorporated.   -  Differential diagnosis includes: Differential diagnosis: Abdominal Aortic Aneurysm, Acute Coronary Syndrome, Ischemic Bowel, Bowel Obstruction (including Gastric Outlet Obstruction), PUD, GERD, Acute Cholecystitis, Pancreatitis, Hepatitis, Colitis, SMA Syndrome, Mesenteric Steal Syndrome, Splanchnic Vein Thrombosis, Appendicitis, Diverticulitis, Pyelonephritis, UTI, STD, Gonad Torsion, other     -  Work-up included:  See above  -  ED treatment included: See above  -  Results discussed with patient. Labs show elevated lipase. Laboratory below is unremarkable. Imaging studies show no emergent intra-abdominal abnormalities. Patient feels improved on reevaluation. Symptomatic treatment with expectant management discussed with the patient and they and/or family members present are amenable to treatment plan and outpatient follow-up. Strict return precautions were discussed with the patient and those present. They demonstrated understanding of when to return to the emergency department for new or worsening symptoms. .  The patient is agreeable with plan of care and disposition. REASSESSMENT          CRITICAL CARE TIME   Total Critical Care time was 0 minutes, excluding separately reportable procedures. There was a high probability of clinically significant/life threatening deterioration in the patient's condition which required my urgent intervention. CONSULTS:  None    PROCEDURES:  Unless otherwise noted below, none     Procedures    FINAL IMPRESSION      1. Generalized abdominal pain          DISPOSITION/PLAN   DISPOSITION        PATIENT REFERREDTO:  No follow-up provider specified.     DISCHARGEMEDICATIONS:  New Prescriptions    No medications on file          (Please note that portions of this note were completed with a voice recognition program.  Efforts were made to edit the dictations but occasionally words are mis-transcribed.)    Lashay Barroso MD (electronically signed)  Attending Emergency Physician          Lashay Barroso MD  08/30/21 7410

## 2021-08-30 NOTE — ED NOTES
D/c instructions given to pt and pt's wife and verbalized understanding.   No further questions     Antonette Noble RN  08/30/21 6185

## 2022-07-23 ENCOUNTER — APPOINTMENT (OUTPATIENT)
Dept: CT IMAGING | Age: 67
DRG: 167 | End: 2022-07-23
Payer: OTHER GOVERNMENT

## 2022-07-23 ENCOUNTER — HOSPITAL ENCOUNTER (INPATIENT)
Age: 67
LOS: 3 days | Discharge: HOME | DRG: 167 | End: 2022-07-26
Attending: STUDENT IN AN ORGANIZED HEALTH CARE EDUCATION/TRAINING PROGRAM | Admitting: HOSPITALIST
Payer: OTHER GOVERNMENT

## 2022-07-23 ENCOUNTER — APPOINTMENT (OUTPATIENT)
Dept: GENERAL RADIOLOGY | Age: 67
DRG: 167 | End: 2022-07-23
Payer: OTHER GOVERNMENT

## 2022-07-23 DIAGNOSIS — I26.02 ACUTE SADDLE PULMONARY EMBOLISM WITH ACUTE COR PULMONALE (HCC): Primary | ICD-10-CM

## 2022-07-23 PROBLEM — E11.65 UNCONTROLLED TYPE 2 DIABETES MELLITUS WITH HYPERGLYCEMIA (HCC): Status: ACTIVE | Noted: 2022-07-23

## 2022-07-23 LAB
A/G RATIO: 1.6 (ref 1.1–2.2)
ALBUMIN SERPL-MCNC: 4.5 G/DL (ref 3.4–5)
ALP BLD-CCNC: 123 U/L (ref 40–129)
ALT SERPL-CCNC: 148 U/L (ref 10–40)
ANION GAP SERPL CALCULATED.3IONS-SCNC: 19 MMOL/L (ref 3–16)
ANTI-XA UNFRAC HEPARIN: 0.93 IU/ML (ref 0.3–0.7)
APTT: 29.9 SEC (ref 23–34.3)
AST SERPL-CCNC: 180 U/L (ref 15–37)
BASOPHILS ABSOLUTE: 0 K/UL (ref 0–0.2)
BASOPHILS RELATIVE PERCENT: 0.3 %
BILIRUB SERPL-MCNC: 1.5 MG/DL (ref 0–1)
BUN BLDV-MCNC: 20 MG/DL (ref 7–20)
CALCIUM SERPL-MCNC: 10.3 MG/DL (ref 8.3–10.6)
CHLORIDE BLD-SCNC: 98 MMOL/L (ref 99–110)
CO2: 20 MMOL/L (ref 21–32)
CREAT SERPL-MCNC: 1 MG/DL (ref 0.8–1.3)
EKG ATRIAL RATE: 106 BPM
EKG DIAGNOSIS: NORMAL
EKG P AXIS: 51 DEGREES
EKG P-R INTERVAL: 140 MS
EKG Q-T INTERVAL: 366 MS
EKG QRS DURATION: 92 MS
EKG QTC CALCULATION (BAZETT): 486 MS
EKG R AXIS: 86 DEGREES
EKG T AXIS: 30 DEGREES
EKG VENTRICULAR RATE: 106 BPM
EOSINOPHILS ABSOLUTE: 0.1 K/UL (ref 0–0.6)
EOSINOPHILS RELATIVE PERCENT: 0.5 %
GFR AFRICAN AMERICAN: >60
GFR NON-AFRICAN AMERICAN: >60
GLUCOSE BLD-MCNC: 154 MG/DL (ref 70–99)
GLUCOSE BLD-MCNC: 252 MG/DL (ref 70–99)
GLUCOSE BLD-MCNC: 267 MG/DL (ref 70–99)
HCT VFR BLD CALC: 44.1 % (ref 40.5–52.5)
HEMOGLOBIN: 14.5 G/DL (ref 13.5–17.5)
LACTIC ACID: 2.2 MMOL/L (ref 0.4–2)
LYMPHOCYTES ABSOLUTE: 1.4 K/UL (ref 1–5.1)
LYMPHOCYTES RELATIVE PERCENT: 11.7 %
MCH RBC QN AUTO: 29.3 PG (ref 26–34)
MCHC RBC AUTO-ENTMCNC: 33 G/DL (ref 31–36)
MCV RBC AUTO: 88.8 FL (ref 80–100)
MONOCYTES ABSOLUTE: 0.9 K/UL (ref 0–1.3)
MONOCYTES RELATIVE PERCENT: 7.3 %
NEUTROPHILS ABSOLUTE: 9.8 K/UL (ref 1.7–7.7)
NEUTROPHILS RELATIVE PERCENT: 80.2 %
PDW BLD-RTO: 14.3 % (ref 12.4–15.4)
PERFORMED ON: ABNORMAL
PERFORMED ON: ABNORMAL
PLATELET # BLD: 177 K/UL (ref 135–450)
PMV BLD AUTO: 6.3 FL (ref 5–10.5)
POTASSIUM REFLEX MAGNESIUM: 4.5 MMOL/L (ref 3.5–5.1)
PROCALCITONIN: 0.09 NG/ML (ref 0–0.15)
RBC # BLD: 4.97 M/UL (ref 4.2–5.9)
SARS-COV-2, NAAT: NOT DETECTED
SODIUM BLD-SCNC: 137 MMOL/L (ref 136–145)
TOTAL PROTEIN: 7.4 G/DL (ref 6.4–8.2)
TROPONIN: 0.19 NG/ML
WBC # BLD: 12.2 K/UL (ref 4–11)

## 2022-07-23 PROCEDURE — 2060000000 HC ICU INTERMEDIATE R&B

## 2022-07-23 PROCEDURE — 36415 COLL VENOUS BLD VENIPUNCTURE: CPT

## 2022-07-23 PROCEDURE — 2580000003 HC RX 258: Performed by: PHYSICIAN ASSISTANT

## 2022-07-23 PROCEDURE — 72125 CT NECK SPINE W/O DYE: CPT

## 2022-07-23 PROCEDURE — 71045 X-RAY EXAM CHEST 1 VIEW: CPT

## 2022-07-23 PROCEDURE — 6370000000 HC RX 637 (ALT 250 FOR IP): Performed by: HOSPITALIST

## 2022-07-23 PROCEDURE — 80053 COMPREHEN METABOLIC PANEL: CPT

## 2022-07-23 PROCEDURE — 6360000002 HC RX W HCPCS: Performed by: PHYSICIAN ASSISTANT

## 2022-07-23 PROCEDURE — 84145 PROCALCITONIN (PCT): CPT

## 2022-07-23 PROCEDURE — 84153 ASSAY OF PSA TOTAL: CPT

## 2022-07-23 PROCEDURE — 96374 THER/PROPH/DIAG INJ IV PUSH: CPT

## 2022-07-23 PROCEDURE — C9113 INJ PANTOPRAZOLE SODIUM, VIA: HCPCS | Performed by: HOSPITALIST

## 2022-07-23 PROCEDURE — 85730 THROMBOPLASTIN TIME PARTIAL: CPT

## 2022-07-23 PROCEDURE — 94761 N-INVAS EAR/PLS OXIMETRY MLT: CPT

## 2022-07-23 PROCEDURE — 85520 HEPARIN ASSAY: CPT

## 2022-07-23 PROCEDURE — 87040 BLOOD CULTURE FOR BACTERIA: CPT

## 2022-07-23 PROCEDURE — 83036 HEMOGLOBIN GLYCOSYLATED A1C: CPT

## 2022-07-23 PROCEDURE — 93005 ELECTROCARDIOGRAM TRACING: CPT | Performed by: PHYSICIAN ASSISTANT

## 2022-07-23 PROCEDURE — 99285 EMERGENCY DEPT VISIT HI MDM: CPT

## 2022-07-23 PROCEDURE — 87635 SARS-COV-2 COVID-19 AMP PRB: CPT

## 2022-07-23 PROCEDURE — 93010 ELECTROCARDIOGRAM REPORT: CPT | Performed by: INTERNAL MEDICINE

## 2022-07-23 PROCEDURE — 70486 CT MAXILLOFACIAL W/O DYE: CPT

## 2022-07-23 PROCEDURE — 6360000004 HC RX CONTRAST MEDICATION: Performed by: PHYSICIAN ASSISTANT

## 2022-07-23 PROCEDURE — 83605 ASSAY OF LACTIC ACID: CPT

## 2022-07-23 PROCEDURE — 85025 COMPLETE CBC W/AUTO DIFF WBC: CPT

## 2022-07-23 PROCEDURE — 84484 ASSAY OF TROPONIN QUANT: CPT

## 2022-07-23 PROCEDURE — 70450 CT HEAD/BRAIN W/O DYE: CPT

## 2022-07-23 PROCEDURE — 6360000002 HC RX W HCPCS: Performed by: HOSPITALIST

## 2022-07-23 PROCEDURE — 2700000000 HC OXYGEN THERAPY PER DAY

## 2022-07-23 PROCEDURE — 71260 CT THORAX DX C+: CPT | Performed by: PHYSICIAN ASSISTANT

## 2022-07-23 PROCEDURE — 96361 HYDRATE IV INFUSION ADD-ON: CPT

## 2022-07-23 RX ORDER — LISINOPRIL 10 MG/1
10 TABLET ORAL DAILY
Status: DISCONTINUED | OUTPATIENT
Start: 2022-07-23 | End: 2022-07-26 | Stop reason: HOSPADM

## 2022-07-23 RX ORDER — DEXTROSE MONOHYDRATE 100 MG/ML
INJECTION, SOLUTION INTRAVENOUS CONTINUOUS PRN
Status: DISCONTINUED | OUTPATIENT
Start: 2022-07-23 | End: 2022-07-26 | Stop reason: HOSPADM

## 2022-07-23 RX ORDER — 0.9 % SODIUM CHLORIDE 0.9 %
1000 INTRAVENOUS SOLUTION INTRAVENOUS ONCE
Status: COMPLETED | OUTPATIENT
Start: 2022-07-23 | End: 2022-07-23

## 2022-07-23 RX ORDER — POTASSIUM CHLORIDE 7.45 MG/ML
10 INJECTION INTRAVENOUS PRN
Status: DISCONTINUED | OUTPATIENT
Start: 2022-07-23 | End: 2022-07-26 | Stop reason: HOSPADM

## 2022-07-23 RX ORDER — SODIUM CHLORIDE 0.9 % (FLUSH) 0.9 %
10 SYRINGE (ML) INJECTION PRN
Status: DISCONTINUED | OUTPATIENT
Start: 2022-07-23 | End: 2022-07-26

## 2022-07-23 RX ORDER — SENNA PLUS 8.6 MG/1
1 TABLET ORAL DAILY PRN
Status: DISCONTINUED | OUTPATIENT
Start: 2022-07-23 | End: 2022-07-26 | Stop reason: HOSPADM

## 2022-07-23 RX ORDER — SPIRONOLACTONE 25 MG/1
25 TABLET ORAL DAILY
Status: DISCONTINUED | OUTPATIENT
Start: 2022-07-23 | End: 2022-07-26 | Stop reason: HOSPADM

## 2022-07-23 RX ORDER — POTASSIUM CHLORIDE 20 MEQ/1
40 TABLET, EXTENDED RELEASE ORAL PRN
Status: DISCONTINUED | OUTPATIENT
Start: 2022-07-23 | End: 2022-07-26 | Stop reason: HOSPADM

## 2022-07-23 RX ORDER — ONDANSETRON 2 MG/ML
4 INJECTION INTRAMUSCULAR; INTRAVENOUS ONCE
Status: COMPLETED | OUTPATIENT
Start: 2022-07-23 | End: 2022-07-23

## 2022-07-23 RX ORDER — HEPARIN SODIUM 1000 [USP'U]/ML
6600 INJECTION, SOLUTION INTRAVENOUS; SUBCUTANEOUS ONCE
Status: COMPLETED | OUTPATIENT
Start: 2022-07-23 | End: 2022-07-23

## 2022-07-23 RX ORDER — PANTOPRAZOLE SODIUM 40 MG/10ML
40 INJECTION, POWDER, LYOPHILIZED, FOR SOLUTION INTRAVENOUS DAILY
Status: DISCONTINUED | OUTPATIENT
Start: 2022-07-23 | End: 2022-07-26 | Stop reason: HOSPADM

## 2022-07-23 RX ORDER — HEPARIN SODIUM 10000 [USP'U]/100ML
1070 INJECTION, SOLUTION INTRAVENOUS CONTINUOUS
Status: DISCONTINUED | OUTPATIENT
Start: 2022-07-23 | End: 2022-07-25

## 2022-07-23 RX ORDER — TAMSULOSIN HYDROCHLORIDE 0.4 MG/1
0.4 CAPSULE ORAL NIGHTLY
Status: DISCONTINUED | OUTPATIENT
Start: 2022-07-23 | End: 2022-07-26 | Stop reason: HOSPADM

## 2022-07-23 RX ORDER — GABAPENTIN 300 MG/1
600 CAPSULE ORAL NIGHTLY
Status: DISCONTINUED | OUTPATIENT
Start: 2022-07-23 | End: 2022-07-26 | Stop reason: HOSPADM

## 2022-07-23 RX ORDER — CARVEDILOL 3.12 MG/1
12.5 TABLET ORAL 2 TIMES DAILY WITH MEALS
Status: DISCONTINUED | OUTPATIENT
Start: 2022-07-23 | End: 2022-07-26 | Stop reason: HOSPADM

## 2022-07-23 RX ORDER — ATORVASTATIN CALCIUM 40 MG/1
80 TABLET, FILM COATED ORAL NIGHTLY
Status: DISCONTINUED | OUTPATIENT
Start: 2022-07-24 | End: 2022-07-26 | Stop reason: HOSPADM

## 2022-07-23 RX ORDER — HEPARIN SODIUM 1000 [USP'U]/ML
3300 INJECTION, SOLUTION INTRAVENOUS; SUBCUTANEOUS PRN
Status: DISCONTINUED | OUTPATIENT
Start: 2022-07-23 | End: 2022-07-25

## 2022-07-23 RX ORDER — ONDANSETRON 2 MG/ML
4 INJECTION INTRAMUSCULAR; INTRAVENOUS EVERY 6 HOURS PRN
Status: DISCONTINUED | OUTPATIENT
Start: 2022-07-23 | End: 2022-07-26

## 2022-07-23 RX ORDER — INSULIN LISPRO 100 [IU]/ML
0-4 INJECTION, SOLUTION INTRAVENOUS; SUBCUTANEOUS NIGHTLY
Status: DISCONTINUED | OUTPATIENT
Start: 2022-07-23 | End: 2022-07-26 | Stop reason: HOSPADM

## 2022-07-23 RX ORDER — PROMETHAZINE HYDROCHLORIDE 25 MG/1
12.5 TABLET ORAL EVERY 6 HOURS PRN
Status: DISCONTINUED | OUTPATIENT
Start: 2022-07-23 | End: 2022-07-26

## 2022-07-23 RX ORDER — LORAZEPAM 0.5 MG/1
0.5 TABLET ORAL EVERY 6 HOURS PRN
Status: DISCONTINUED | OUTPATIENT
Start: 2022-07-23 | End: 2022-07-26 | Stop reason: HOSPADM

## 2022-07-23 RX ORDER — ACETAMINOPHEN 650 MG/1
650 SUPPOSITORY RECTAL EVERY 6 HOURS PRN
Status: DISCONTINUED | OUTPATIENT
Start: 2022-07-23 | End: 2022-07-26 | Stop reason: HOSPADM

## 2022-07-23 RX ORDER — SODIUM CHLORIDE 0.9 % (FLUSH) 0.9 %
10 SYRINGE (ML) INJECTION EVERY 12 HOURS SCHEDULED
Status: DISCONTINUED | OUTPATIENT
Start: 2022-07-23 | End: 2022-07-26

## 2022-07-23 RX ORDER — INSULIN LISPRO 100 [IU]/ML
0-8 INJECTION, SOLUTION INTRAVENOUS; SUBCUTANEOUS
Status: DISCONTINUED | OUTPATIENT
Start: 2022-07-23 | End: 2022-07-26 | Stop reason: HOSPADM

## 2022-07-23 RX ORDER — ACETAMINOPHEN 325 MG/1
650 TABLET ORAL EVERY 6 HOURS PRN
Status: DISCONTINUED | OUTPATIENT
Start: 2022-07-23 | End: 2022-07-26 | Stop reason: HOSPADM

## 2022-07-23 RX ORDER — INSULIN GLARGINE 100 [IU]/ML
0.25 INJECTION, SOLUTION SUBCUTANEOUS NIGHTLY
Status: DISCONTINUED | OUTPATIENT
Start: 2022-07-23 | End: 2022-07-26 | Stop reason: HOSPADM

## 2022-07-23 RX ORDER — ASPIRIN 81 MG/1
81 TABLET ORAL DAILY
Status: DISCONTINUED | OUTPATIENT
Start: 2022-07-23 | End: 2022-07-26 | Stop reason: HOSPADM

## 2022-07-23 RX ORDER — MAGNESIUM SULFATE IN WATER 40 MG/ML
2000 INJECTION, SOLUTION INTRAVENOUS PRN
Status: DISCONTINUED | OUTPATIENT
Start: 2022-07-23 | End: 2022-07-26 | Stop reason: HOSPADM

## 2022-07-23 RX ORDER — SODIUM CHLORIDE 9 MG/ML
INJECTION, SOLUTION INTRAVENOUS PRN
Status: DISCONTINUED | OUTPATIENT
Start: 2022-07-23 | End: 2022-07-26 | Stop reason: HOSPADM

## 2022-07-23 RX ORDER — HEPARIN SODIUM 1000 [USP'U]/ML
6600 INJECTION, SOLUTION INTRAVENOUS; SUBCUTANEOUS PRN
Status: DISCONTINUED | OUTPATIENT
Start: 2022-07-23 | End: 2022-07-25

## 2022-07-23 RX ADMIN — PANTOPRAZOLE SODIUM 40 MG: 40 INJECTION, POWDER, FOR SOLUTION INTRAVENOUS at 16:38

## 2022-07-23 RX ADMIN — IOPAMIDOL 75 ML: 755 INJECTION, SOLUTION INTRAVENOUS at 14:21

## 2022-07-23 RX ADMIN — CARVEDILOL 12.5 MG: 3.12 TABLET, FILM COATED ORAL at 16:39

## 2022-07-23 RX ADMIN — ACETAMINOPHEN 650 MG: 325 TABLET ORAL at 21:21

## 2022-07-23 RX ADMIN — SODIUM CHLORIDE 1000 ML: 9 INJECTION, SOLUTION INTRAVENOUS at 13:09

## 2022-07-23 RX ADMIN — HEPARIN SODIUM 6600 UNITS: 1000 INJECTION INTRAVENOUS; SUBCUTANEOUS at 15:05

## 2022-07-23 RX ADMIN — SPIRONOLACTONE 25 MG: 25 TABLET ORAL at 16:40

## 2022-07-23 RX ADMIN — INSULIN GLARGINE 23 UNITS: 100 INJECTION, SOLUTION SUBCUTANEOUS at 21:16

## 2022-07-23 RX ADMIN — HEPARIN SODIUM 1490 UNITS/HR: 10000 INJECTION, SOLUTION INTRAVENOUS at 15:11

## 2022-07-23 RX ADMIN — GABAPENTIN 600 MG: 300 CAPSULE ORAL at 21:21

## 2022-07-23 RX ADMIN — LISINOPRIL 10 MG: 10 TABLET ORAL at 16:40

## 2022-07-23 RX ADMIN — TAMSULOSIN HYDROCHLORIDE 0.4 MG: 0.4 CAPSULE ORAL at 21:21

## 2022-07-23 RX ADMIN — ASPIRIN 81 MG: 81 TABLET, COATED ORAL at 16:40

## 2022-07-23 RX ADMIN — ONDANSETRON 4 MG: 2 INJECTION INTRAMUSCULAR; INTRAVENOUS at 13:11

## 2022-07-23 ASSESSMENT — PAIN SCALES - GENERAL
PAINLEVEL_OUTOF10: 4
PAINLEVEL_OUTOF10: 4

## 2022-07-23 ASSESSMENT — LIFESTYLE VARIABLES
HOW OFTEN DO YOU HAVE A DRINK CONTAINING ALCOHOL: NEVER
HOW MANY STANDARD DRINKS CONTAINING ALCOHOL DO YOU HAVE ON A TYPICAL DAY: PATIENT DOES NOT DRINK

## 2022-07-23 ASSESSMENT — PAIN DESCRIPTION - LOCATION: LOCATION: MOUTH

## 2022-07-23 ASSESSMENT — PAIN - FUNCTIONAL ASSESSMENT: PAIN_FUNCTIONAL_ASSESSMENT: 0-10

## 2022-07-23 NOTE — H&P
The left ventricular systolic function is mildly reduced with an ejection   fraction of 45-50 %. There is hypokinesis of the basal/mid inferior wall. There is mild concentric left ventricular hypertrophy. Grade I diastolic dysfunction with normal left ventricular filling pressure. REVIEW OF SYSTEMS:   Constitutional: Negative for fever,chills; positive generalized weakness  ENT: Oral facial pain s/p syncopal event; denies headache, rhinorrhea, and sore throat. Respiratory: Positive for sudden onset dyspnea with hyperventilation prior to syncopal event; denies wheezing cough   Cardiovascular: Negative for chest pain, palpitations, peripheral edema, orthopnea or PND; positive history CAD s/p LUI placement in 2019  Gastrointestinal: Negative for N/V/D and abdominal pain; no hematemesis, hematochezia, or melena; no anorexia  Genitourinary: Negative for dysuria, frequency, retention; no incontinence  Hematologic/Lymphatic: Negative for bleeding tendency/excessive bruising  Musculoskeletal: Negative for myalgias and arthalgias; able to ambulate without difficulty  Neurologic: Positive LOC was syncope and collapse; denies seizure activity, paresthesias, dysarthria, vertigo, and gait disturbance  Skin: Negative for itching,rash, decubitus  Psychiatric: Negative for depression,anxiety, and agitation; no hallucinations; denies SI/HI  Endocrine: Type II DM managed with oral agents; not well controlled    Past Medical History:   has a past medical history of Diabetes mellitus ([facility] [address] Hyperlipidemia, Hypertension, and MI (myocardial infarction) ([facility] [address] Past Surgical History:   has a past surgical history that includes Muscle Repair; Appendectomy; shoulder surgery; and Coronary angioplasty with stent. Medications:  No current facility-administered medications on file prior to encounter.      Current Outpatient Medications on File Prior to Encounter   Medication Sig Dispense Refill    ondansetron (ZOFRAN ODT) 4 MG disintegrating tablet Take 1 tablet by mouth every 8 hours as needed for Nausea 20 tablet 0    dicyclomine (BENTYL) 10 MG capsule Take 1 capsule by mouth every 6 hours as needed (cramps) 20 capsule 0    clopidogrel (PLAVIX) 75 MG tablet Take 75 mg by mouth daily      nitroGLYCERIN (NITROSTAT) 0.4 MG SL tablet Place 1 tablet under the tongue every 5 minutes as needed for Chest pain up to max of 3 total doses. If no relief after 1 dose, call 911. 25 tablet 3    alogliptin (NESINA) 25 MG TABS tablet Take 25 mg by mouth daily      baclofen (LIORESAL) 10 MG tablet Take 10 mg by mouth 3 times daily as needed (as needed for muscle spasms)      gabapentin (NEURONTIN) 600 MG tablet Take 600 mg by mouth 2 times daily. atorvastatin (LIPITOR) 80 MG tablet Take 1 tablet by mouth nightly 30 tablet 3    lisinopril (PRINIVIL;ZESTRIL) 5 MG tablet Take 1 tablet by mouth daily 30 tablet 3    carvedilol (COREG) 6.25 MG tablet Take 1 tablet by mouth 2 times daily (with meals) 60 tablet 3    aspirin 81 MG chewable tablet Take 1 tablet by mouth daily 30 tablet 3    glipiZIDE (GLUCOTROL) 10 MG tablet Take 5 mg by mouth 2 times daily (before meals)      metFORMIN (GLUCOPHAGE) 1000 MG tablet Take 1,000 mg by mouth Daily with lunch      tamsulosin (FLOMAX) 0.4 MG capsule Take 0.4 mg by mouth daily         Allergies: Allergies   Allergen Reactions    Ciprofloxacin         Social History:   reports that he has quit smoking. He has never used smokeless tobacco. He reports current alcohol use. He reports that he does not use drugs. Family History:  family history is not on file.      Physical Exam:  /87   Pulse 96   Temp 97.4 °F (36.3 °C) (Axillary)   Resp 22   Ht 6' (1.829 m)   Wt 200 lb (90.7 kg)   SpO2 91%   BMI 27.12 kg/m²     General appearance: WDWN adult male resting in bed with no signs of distress  Eyes: Sclera clear without conjunctival injection; PERRLA; EOMI  ENT: Mucous membranes moist without thrush; normal Final Result   1. Acute bilateral pulmonary embolism. Additional evidence of right   ventricular strain. 2. The lungs are clear with mild granulomatous change incidentally noted on   the right. Findings were discussed with Geoffrey Gu at 2:35 pm on 7/23/2022. XR CHEST PORTABLE   Preliminary Result   No acute cardiopulmonary findings. EKG: Ventricular Rate 106 P BPM QTc Calculation (Bazett) 486 P ms   Atrial Rate 106 P BPM P Axis 51 P degrees   P-R Interval 140 P ms R Axis 86 P degrees   QRS Duration 92 P ms T Axis 30 P degrees   Q-T Interval 366 P ms Diagnosis Sinus tachycardia; no incomplete right bundle branch block; QTC prolongation     I visualized CXR images and EKG strips personally and agree with documented interpretation    Discussed case  with ED provider    Problem List:  Principal Problem:    Acute saddle pulmonary embolism with acute cor pulmonale (HCC)  Active Problems:    HTN (hypertension)    Hyperlipidemia    Coronary artery disease involving native coronary artery of native heart with unstable angina pectoris (HCC)    Ischemic cardiomyopathy    Uncontrolled type 2 diabetes mellitus with hyperglycemia ([facility] [address]  Resolved Problems:    * No resolved hospital problems.  *        Consults:  None      Assessment/Plan:     Acute bilateral PE with RV strain  -Patient admitted to PCU for continuous telemetry and pulse oximetry monitoring  -Encourage incentive spirometry; PRN supplemental O2 scheduled  -Patient initiated on high-dose weight-based heparin GTT overnight with serial aPTT/ Anti-FXa levels to ensure therapeutic anticoagulation  -ECHO scheduled to further assess RV heart strain suggested on CT scan  -BLE venous duplex scheduled to identify initial source of clot burden   -Consult placed to CT surgery to determine if patient is a candidate for embolectomy or EKOS tPA    CAD s/p PCI with LUI  -Patient admitted to telemetry floor for continuous monitoring during stay  -EKG obtained in ED reviewed personally and notable for new RBBB without ischemia  -Continue home medication dosage of Coreg, lisinopril, Aldactone, and EC ASA; patient reports he discontinued his statin sometime ago as he felt it was unnecessary    Uncontrolled type II DM  -Patient endorses frequent POC glucose ranging 200s-300s despite multiple oral hypoglycemic agents; A1c pending  -All oral hypoglycemics placed on temporary hold during hospital stay  -Patient initiated on weight-based Lantus nightly  -Medium dose Humalog SSI to be administered before meals and at bedtime based on POC glucose level  -Carb restriction added to diet    DVT prophylaxis-high-dose weight-based heparin drip to treat PE  Code status-full code  Diet-cardiac 2 g sodium with carb restriction  IV access-PIV established in ED      Admit as inpatient. I anticipate hospitalization spanning more than two midnights for investigation and treatment of the above medically necessary diagnoses. Comment: Please note this report has been produced using speech recognition software and may contain errors related to that system including errors in grammar, punctuation, and spelling, as well as words and phrases that may be inappropriate. If there are any questions or concerns please feel free to contact the dictating provider for clarification.          Camila Cooney MD    7/23/2022 2:53 PM

## 2022-07-23 NOTE — ED NOTES
[id number] for consult per ALEIDA Layne   RE: saddle PE w/ heart strain  [id number]- Dr. Ruffin [place] spoke with ALEIDA Kaufman  07/23/22 2828

## 2022-07-23 NOTE — CONSULTS
7/23/22 @ 18:45 added Dr. Leonidas Wise to treatment team for [facility] consult.  Manning Schaumann

## 2022-07-23 NOTE — ED PROVIDER NOTES
[facility] Emergency Department    CHIEF COMPLAINT  Loss of Consciousness (Pt arrives to Ed from EMS from home with complaints of LOC. At about 10:15 pt gets up to use bathroom started hyperventilating and had a syncopal episode. Pt fell and hit face on floor. No blood thinners. Pt complains of pain to face. Pt arrives alert and oriented. )      SHARED SERVICE VISIT  I have seen and evaluated this patient with my supervising physician, Dr. Haider Mueller. HISTORY OF PRESENT ILLNESS  Varinder Lebron is a 69 y.o. male who presents to the ED complaining of syncopal episode prior to arrival.  Patient brought in by squad today for evaluation. Patient states that when he woke up this morning had a left lower extremity charley horse. States that he does often have cramping in lower extremities. Reports that he sat up on the edge of the bed and began at that point to hyperventilate. Denies any associated chest pain. No pain with deep inspiration. He has had no cough congestion. Denies fevers chills. Is a non-smoker. States that he laid back down and began to sweat profusely. Denies associated headaches or confusion. Mild lightheadedness. Reports that when he got up out of bed to go to the restroom he does not recall syncopized and although woke up on the floor with facial pain. Wife called squad. Patient denies use of blood thinners. He is complaining of pain in upper mouth. No headache, lightheadedness, dizziness or confusion currently. Does not have any chest pain nor feel short of breath at this time. Does feel mildly nauseous without vomiting. Has had no diarrhea or constipation. No black or bloody stools. Denies any leg pain or swelling outside of cramping. No recent travel, trauma or surgery. Denies fevers chills. No other complaints, modifying factors or associated symptoms. Nursing notes reviewed.    Past Medical History:   Diagnosis Date    Diabetes mellitus ([facility]     Hyperlipidemia     Hypertension     MI (myocardial infarction) ([facility]      Past Surgical History:   Procedure Laterality Date    APPENDECTOMY      CORONARY ANGIOPLASTY WITH STENT PLACEMENT      MUSCLE REPAIR      SHOULDER SURGERY       History reviewed. No pertinent family history. Social History     Socioeconomic History    Marital status:      Spouse name: Not on file    Number of children: Not on file    Years of education: Not on file    Highest education level: Not on file   Occupational History    Not on file   Tobacco Use    Smoking status: Former    Smokeless tobacco: Never   Substance and Sexual Activity    Alcohol use: Yes     Comment: rarely    Drug use: No    Sexual activity: Not on file   Other Topics Concern    Not on file   Social History Narrative    Not on file     Social Determinants of Health     Financial Resource Strain: Not on file   Food Insecurity: Not on file   Transportation Needs: Not on file   Physical Activity: Not on file   Stress: Not on file   Social Connections: Not on file   Intimate Partner Violence: Not on file   Housing Stability: Not on file     Current Facility-Administered Medications   Medication Dose Route Frequency Provider Last Rate Last Admin    ondansetron (ZOFRAN) injection 4 mg  4 mg IntraVENous Once ALEIDA Goodrich        0.9 % sodium chloride bolus  1,000 mL IntraVENous Once ALEIDA Goodrich         Current Outpatient Medications   Medication Sig Dispense Refill    ondansetron (ZOFRAN ODT) 4 MG disintegrating tablet Take 1 tablet by mouth every 8 hours as needed for Nausea 20 tablet 0    dicyclomine (BENTYL) 10 MG capsule Take 1 capsule by mouth every 6 hours as needed (cramps) 20 capsule 0    clopidogrel (PLAVIX) 75 MG tablet Take 75 mg by mouth daily      nitroGLYCERIN (NITROSTAT) 0.4 MG SL tablet Place 1 tablet under the tongue every 5 minutes as needed for Chest pain up to max of 3 total doses.  If no relief after 1 dose, call 911. 25 tablet 3 alogliptin (NESINA) 25 MG TABS tablet Take 25 mg by mouth daily      baclofen (LIORESAL) 10 MG tablet Take 10 mg by mouth 3 times daily as needed (as needed for muscle spasms)      gabapentin (NEURONTIN) 600 MG tablet Take 600 mg by mouth 2 times daily. atorvastatin (LIPITOR) 80 MG tablet Take 1 tablet by mouth nightly 30 tablet 3    lisinopril (PRINIVIL;ZESTRIL) 5 MG tablet Take 1 tablet by mouth daily 30 tablet 3    carvedilol (COREG) 6.25 MG tablet Take 1 tablet by mouth 2 times daily (with meals) 60 tablet 3    aspirin 81 MG chewable tablet Take 1 tablet by mouth daily 30 tablet 3    glipiZIDE (GLUCOTROL) 10 MG tablet Take 5 mg by mouth 2 times daily (before meals)      metFORMIN (GLUCOPHAGE) 1000 MG tablet Take 1,000 mg by mouth Daily with lunch      tamsulosin (FLOMAX) 0.4 MG capsule Take 0.4 mg by mouth daily       Allergies   Allergen Reactions    Ciprofloxacin        REVIEW OF SYSTEMS  10 systems reviewed, pertinent positives per HPI otherwise noted to be negative    PHYSICAL EXAM  BP (!) 142/93   Pulse (!) 102   Temp 97.4 °F (36.3 °C) (Axillary)   Resp 17   Ht 6' (1.829 m)   Wt 200 lb (90.7 kg)   SpO2 93%   BMI 27.12 kg/m²   GENERAL APPEARANCE: Awake and alert. Cooperative. No acute distress. HEAD: Normocephalic. Atraumatic. No hematomas, lesions, lacerations or abrasions. Negative for chowdhury signs or raccoon's eyes. EYES: PERRL. EOM's grossly intact. No periorbital edema or erythema. No proptosis. No globe tenderness. No blood noted to anterior chambers. ENT: Mucous membranes are moist.  Patient with soft tissue swelling and discomfort to upper lip. The right front tooth is loose appearing. Patient states that that is a. No TMJ pain or malocclusion. No pain at nasal bridge or any additional facial tenderness. Dried blood noted in the nares bilaterally. No active bleeding. No epistaxis. Shira Burnside NECK: Supple. No midline bony tenderness.   No crepitus, deformity, or step-offs noted. No swelling, bruising, or color change. HEART: Tachycardic. No murmurs. No chest wall tenderness. LUNGS: Respirations unlabored. CTAB. Good air exchange. Speaking comfortably in full sentences. No wheezing, rhonchi, rales. ABDOMEN: Soft. Non-distended. Non-tender. No guarding or rebound. Negative Hathaway's, McBurney's and Rovsing's. No fluids or ascites. No hernias or masses. Bowel sounds normal in all quadrants. No CVA tenderness. EXTREMITIES: No peripheral edema. No unilateral calf pain, redness or swelling. Moves all extremities equally. All extremities neurovascularly intact. SKIN: Warm and dry. No acute rashes. NEUROLOGICAL: Alert and oriented. CN's 2-12 intact. No gross facial drooping. Strength 5/5, sensation intact. PSYCHIATRIC: Normal mood and affect. RADIOLOGY  CT HEAD WO CONTRAST    Result Date: 7/23/2022  EXAMINATION: CT OF THE HEAD WITHOUT CONTRAST  7/23/2022 1:53 pm TECHNIQUE: CT of the head was performed without the administration of intravenous contrast. Automated exposure control, iterative reconstruction, and/or weight based adjustment of the mA/kV was utilized to reduce the radiation dose to as low as reasonably achievable. COMPARISON: None. HISTORY: ORDERING SYSTEM PROVIDED HISTORY: fall TECHNOLOGIST PROVIDED HISTORY: Reason for exam:->fall Has a \"code stroke\" or \"stroke alert\" been called? ->No Decision Support Exception - unselect if not a suspected or confirmed emergency medical condition->Emergency Medical Condition (MA) Reason for Exam: pt had a syncopal episode and fell,hit his face FINDINGS: BRAIN/VENTRICLES: The ventricles and sulci are prominent. Pattern is consistent with age-related atrophy. No extra-axial fluid collections, and no sign of recent intracranial hemorrhage. Decreased attenuation is noted within the periventricular white matter. Pattern is consistent with chronic small vessel ischemic change. No acute edema or mass effect.  No mass lesions are detected. ORBITS: The visualized portion of the orbits demonstrate no acute abnormality. SINUSES: Mild mucosal thickening is observed in the visualized sphenoid sinuses. Paranasal sinuses are otherwise clear. SOFT TISSUES/SKULL:  No acute abnormality of the visualized skull or soft tissues. No acute intracranial abnormality. CT FACIAL BONES WO CONTRAST    Result Date: 7/23/2022  EXAMINATION: CT OF THE FACE WITHOUT CONTRAST  7/23/2022 1:53 pm TECHNIQUE: CT of the face was performed without the administration of intravenous contrast. Multiplanar reformatted images are provided for review. Automated exposure control, iterative reconstruction, and/or weight based adjustment of the mA/kV was utilized to reduce the radiation dose to as low as reasonably achievable. COMPARISON: None HISTORY: ORDERING SYSTEM PROVIDED HISTORY: syncope TECHNOLOGIST PROVIDED HISTORY: Reason for exam:->syncope Decision Support Exception - unselect if not a suspected or confirmed emergency medical condition->Emergency Medical Condition (MA) Reason for Exam: pt fell today and landed on his face,one of his teeth are loose,lac to lips FINDINGS: FACIAL BONES: The frontal sinuses, orbital walls, maxilla, pterygoid plates, zygomatic arches, hard palate, nasal bones and mandible are intact. The temporomandibular joints are aligned. ORBITAL CONTENTS: The globes appear intact. The extraocular muscles, optic nerve sheath complexes and lacrimal glands appear unremarkable. No retrobulbar hematoma or mass is seen. SINUSES: Mild mucosal thickening in the sphenoid sinuses. There is also some mucosal changes in the posterior ethmoid air cells. Paranasal sinuses are otherwise clear. SOFT TISSUES: No soft tissue contusion is appreciated of the face. No acute facial bone trauma.      CT CERVICAL SPINE WO CONTRAST    Result Date: 7/23/2022  EXAM: CT Cervical Spine Without Intravenous Contrast EXAM DATE/TIME: 7/23/2022 1:54 pm CLINICAL HISTORY: ORDERING SYSTEM PROVIDED  fall  TECHNOLOGIST PROVIDED HISTORY:  Reason for exam:->fall  Decision Support Exception - unselect if not a suspected or confirmed emergency medical condition->Emergency Medical Condition (MA) Reason for Exam: pt had a syncopal episode today and fell,denies any neck pain TECHNIQUE: Axial computed tomography images of the cervical spine without intravenous contrast.  This CT exam was performed using one or more of the following dose reduction techniques:  automated exposure control, adjustment of the mA and/or kV according to patient size, and/or use of iterative reconstruction technique. COMPARISON: No relevant prior studies available. FINDINGS: Vertebrae:  Multilevel degenerative joint disease within the facets and uncovertebral joints. Mild degenerative anterolisthesis C2 on C3 in C4 on C5. Discs/spinal canal/neural foramina:  C3-C4 and C5-T1 degenerative disc disease. Multilevel bony foraminal narrowing C3 to C7. Degree of bony central canal stenosis C6-C7. Soft tissues:  No acute findings. Vasculature:  Scattered calcified atherosclerotic plaque is noted within the arterial system. No evidence of fracture with multilevel degenerative changes as described. XR CHEST PORTABLE    Result Date: 7/23/2022  EXAMINATION: ONE XRAY VIEW OF THE CHEST 7/23/2022 12:45 pm COMPARISON: Chest radiograph performed October 16, 2019. HISTORY: ORDERING SYSTEM PROVIDED HISTORY: syncope/sob TECHNOLOGIST PROVIDED HISTORY: Reason for exam:-> syncope/sob Reason for Exam: Loss of Consciousness (Pt arrives to Ed from EMS from home with complaints of LOC. At about 10:15 pt gets up to use bathroom started hyperventilating and had a syncopal episode. Pt fell and hit face on floor. No blood thinners. Pt complains of pain to face. Pt arrives alert and oriented. ) FINDINGS: No focal consolidation, pleural effusion, or pneumothorax. Stable cardiomediastinal silhouette.   Atherosclerotic changes of the thoracic aorta. No acute osseous abnormality. No acute cardiopulmonary findings. CT CHEST PULMONARY EMBOLISM W CONTRAST    Result Date: 7/23/2022  EXAMINATION: CTA OF THE CHEST 7/23/2022 1:55 pm TECHNIQUE: CTA of the chest was performed after the administration of intravenous contrast.  Multiplanar reformatted images are provided for review. MIP images are provided for review. Automated exposure control, iterative reconstruction, and/or weight based adjustment of the mA/kV was utilized to reduce the radiation dose to as low as reasonably achievable. COMPARISON: None. HISTORY: ORDERING SYSTEM PROVIDED HISTORY: syncope/tachy TECHNOLOGIST PROVIDED HISTORY: Reason for exam:->syncope/tachy Decision Support Exception - unselect if not a suspected or confirmed emergency medical condition->Emergency Medical Condition (MA) Reason for Exam: shortness of breath,syncopal episode today Relevant Medical/Surgical History: 2 MI,angioplasty with stent FINDINGS: Pulmonary Arteries: The study is of good technical quality. A large saddle emboli are observed within the main pulmonary arteries bilaterally with extensive distribution into the right upper, right lower and left lower lobes and additional extension in the left upper lobe and right middle lobe. There is evidence of right ventricular strain with RV: LV ratio 1.5. Mediastinum: Heart size normal.  Normal aorta. There are calcified mediastinal and right hilar lymph nodes with several granulomata also noted in the right chest. Lungs/pleura: Airways are patent. No pleural fluid is present. No pneumothorax. The lungs are well expanded and clear. Upper Abdomen: Visualized abdominal structures in the upper abdomen are normal. Soft Tissues/Bones: No skeletal abnormalities throughout the chest.     1. Acute bilateral pulmonary embolism. Additional evidence of right ventricular strain. 2. The lungs are clear with mild granulomatous change incidentally noted on the right.  Findings were discussed with Gerard Philippe at 2:35 pm on 7/23/2022. ED COURSE  Patient received Zofran for pain, with good relief. Triage vitals showing tachycardia pulse rate 102. Remainder vital stable. Patient given a 1 L IV fluid bolus. White blood cell count of 12.2 on CBC. No bandemia. Lactate was slightly elevated at 2.2. Blood cultures x2 pending. Procalcitonin 0.09. CMP with mild transaminitis. Otherwise unremarkable. Rapid COVID was negative. Stable portable chest x-ray. EKG consistent with sinus tachycardia with incomplete right bundle branch block which does appear new. No evidence for ischemic changes at this time. Head CT was without evidence to suggest acute intercranial injuries. CT cervical spine without acute fractures dislocations. CT facial bones showing no acute facial trauma status post syncopal episode. Did receive call from radiology with concerning findings on CT PE study showing acute bilateral pulmonary emboli with saddle PE and subsequent right heart strain. Patient did have elevated troponin at 0.19. CT surgeon on-call involved in emergency and feels that if patient unstable requiring intervention that transfer should be considered. Discussed case with IR as well and again we do not have capabilities for intervention here at our facility. Patient on reexamination is feeling better. Despite some mild tachycardia he is still very stable appearing at this time. Discussed case with hospital medicine who agrees with admission here. Patient started on heparin. Admission orders placed. Despite patient technically meeting SIRS criteria there is no source of infection at this time and tachycardia is most probably secondary to pulmonary emboli. A discussion was had with Mr. Nish Boone regarding episode of shortness of breath, diaphoresis and syncopal episode, ED findings and recommendations for admission.   Risk management discussed and shared decision making had with patient and/or surrogate. All questions were answered. Patient in agreement. CRITICAL CARE TIME  60 Minutes of critical care time spent not including separately billable procedures.     MDM  Results for orders placed or performed during the hospital encounter of 07/23/22   COVID-19, Rapid    Specimen: Nasopharyngeal Swab   Result Value Ref Range    SARS-CoV-2, NAAT Not Detected Not Detected   CBC with Auto Differential   Result Value Ref Range    WBC 12.2 (H) 4.0 - 11.0 K/uL    RBC 4.97 4.20 - 5.90 M/uL    Hemoglobin 14.5 13.5 - 17.5 g/dL    Hematocrit 44.1 40.5 - 52.5 %    MCV 88.8 80.0 - 100.0 fL    MCH 29.3 26.0 - 34.0 pg    MCHC 33.0 31.0 - 36.0 g/dL    RDW 14.3 12.4 - 15.4 %    Platelets 843 141 - 920 K/uL    MPV 6.3 5.0 - 10.5 fL    Neutrophils % 80.2 %    Lymphocytes % 11.7 %    Monocytes % 7.3 %    Eosinophils % 0.5 %    Basophils % 0.3 %    Neutrophils Absolute 9.8 (H) 1.7 - 7.7 K/uL    Lymphocytes Absolute 1.4 1.0 - 5.1 K/uL    Monocytes Absolute 0.9 0.0 - 1.3 K/uL    Eosinophils Absolute 0.1 0.0 - 0.6 K/uL    Basophils Absolute 0.0 0.0 - 0.2 K/uL   Comprehensive Metabolic Panel w/ Reflex to MG   Result Value Ref Range    Sodium 137 136 - 145 mmol/L    Potassium reflex Magnesium 4.5 3.5 - 5.1 mmol/L    Chloride 98 (L) 99 - 110 mmol/L    CO2 20 (L) 21 - 32 mmol/L    Anion Gap 19 (H) 3 - 16    Glucose 267 (H) 70 - 99 mg/dL    BUN 20 7 - 20 mg/dL    Creatinine 1.0 0.8 - 1.3 mg/dL    GFR Non-African American >60 >60    GFR African American >60 >60    Calcium 10.3 8.3 - 10.6 mg/dL    Total Protein 7.4 6.4 - 8.2 g/dL    Albumin 4.5 3.4 - 5.0 g/dL    Albumin/Globulin Ratio 1.6 1.1 - 2.2    Total Bilirubin 1.5 (H) 0.0 - 1.0 mg/dL    Alkaline Phosphatase 123 40 - 129 U/L     (H) 10 - 40 U/L     (H) 15 - 37 U/L   Lactic Acid   Result Value Ref Range    Lactic Acid 2.2 (H) 0.4 - 2.0 mmol/L   Troponin   Result Value Ref Range    Troponin 0.19 (H) <0.01 ng/mL   APTT   Result Value Ref Range    aPTT 29.9 23.0 - 34.3 sec   Procalcitonin   Result Value Ref Range    Procalcitonin 0.09 0.00 - 0.15 ng/mL   Anti-Xa, Unfractionated Heparin   Result Value Ref Range    Anti-XA Unfrac Heparin 0.93 (H) 0.30 - 0.70 IU/mL   Comprehensive Metabolic Panel w/ Reflex to MG   Result Value Ref Range    Sodium 135 (L) 136 - 145 mmol/L    Potassium reflex Magnesium 4.3 3.5 - 5.1 mmol/L    Chloride 103 99 - 110 mmol/L    CO2 20 (L) 21 - 32 mmol/L    Anion Gap 12 3 - 16    Glucose 164 (H) 70 - 99 mg/dL    BUN 21 (H) 7 - 20 mg/dL    Creatinine 0.9 0.8 - 1.3 mg/dL    GFR Non-African American >60 >60    GFR African American >60 >60    Calcium 9.4 8.3 - 10.6 mg/dL    Total Protein 6.3 (L) 6.4 - 8.2 g/dL    Albumin 3.8 3.4 - 5.0 g/dL    Albumin/Globulin Ratio 1.5 1.1 - 2.2    Total Bilirubin 1.1 (H) 0.0 - 1.0 mg/dL    Alkaline Phosphatase 98 40 - 129 U/L    ALT 89 (H) 10 - 40 U/L    AST 65 (H) 15 - 37 U/L   CBC auto differential   Result Value Ref Range    WBC 10.9 4.0 - 11.0 K/uL    RBC 4.31 4.20 - 5.90 M/uL    Hemoglobin 12.9 (L) 13.5 - 17.5 g/dL    Hematocrit 37.6 (L) 40.5 - 52.5 %    MCV 87.3 80.0 - 100.0 fL    MCH 30.0 26.0 - 34.0 pg    MCHC 34.4 31.0 - 36.0 g/dL    RDW 14.4 12.4 - 15.4 %    Platelets 475 292 - 876 K/uL    MPV 6.3 5.0 - 10.5 fL    Neutrophils % 59.2 %    Lymphocytes % 30.3 %    Monocytes % 8.9 %    Eosinophils % 1.0 %    Basophils % 0.6 %    Neutrophils Absolute 6.5 1.7 - 7.7 K/uL    Lymphocytes Absolute 3.3 1.0 - 5.1 K/uL    Monocytes Absolute 1.0 0.0 - 1.3 K/uL    Eosinophils Absolute 0.1 0.0 - 0.6 K/uL    Basophils Absolute 0.1 0.0 - 0.2 K/uL   Anti-Xa, Unfractionated Heparin   Result Value Ref Range    Anti-XA Unfrac Heparin 0.84 (H) 0.30 - 0.70 IU/mL   POCT Glucose   Result Value Ref Range    POC Glucose 154 (H) 70 - 99 mg/dl    Performed on ACCU-CHEK    POCT Glucose   Result Value Ref Range    POC Glucose 252 (H) 70 - 99 mg/dl    Performed on ACCU-CHEK    POCT Glucose   Result Value Ref Range    POC Glucose 179 (H) 70 - 99 mg/dl    Performed on ACCU-CHEK    EKG 12 Lead   Result Value Ref Range    Ventricular Rate 106 BPM    Atrial Rate 106 BPM    P-R Interval 140 ms    QRS Duration 92 ms    Q-T Interval 366 ms    QTc Calculation (Bazett) 486 ms    P Axis 51 degrees    R Axis 86 degrees    T Axis 30 degrees    Diagnosis       Sinus tachycardiaIncomplete right bundle branch blockBorderline ECGWhen compared with ECG of 29-AUG-2021 20:51,Incomplete right bundle branch block is now PresentQT has lengthenedConfirmed by Tova Small MD, Moy Purdy ([id number]) on 7/23/2022 4:22:48 PM     I spoke with Padmini Barreto, On-call CT surgeon, Alonzo Cam, and Kelly Mcneal. We thoroughly discussed the history, physical exam, laboratory and imaging studies, as well as, emergency department course. Based upon that discussion, we've decided to admit Fiona Lloyd to the hospital for further observation, evaluation and treatment. Final Impression  1. Acute saddle pulmonary embolism with acute cor pulmonale (HCC)      Blood pressure 116/77, pulse 87, temperature 97.4 °F (36.3 °C), temperature source Oral, resp. rate 24, height 6' (1.829 m), weight 200 lb (90.7 kg), SpO2 94 %. DISPOSITION  Patient was admitted to the hospital in stable condition.           Martina Rogel, [id number]  07/24/22 [id number]

## 2022-07-23 NOTE — CONSULTS
Pharmacy to manage Heparin protocol (VTE, DVT , PE)    Bolus: 6600 units  Drip: 1490 units/hr  Next [facility]Xa 2100    [facility]    7[facility]  Anti-Xa = 0.93 at 2047. Decrease heparin gtt to 1320 units/hr. Recheck Anti-Xa in 6 hours. Cyrus Pham PharmD  7/23/2022 9:08 PM    7/24 0312  Xa = 0.84  Rate =  1150 units/hr  Next Xa 1030  Abdon Bernal, Pharm D.7/24/2022 4:27 AM    7/24 1011  Anti-Xa level 0.80  Decrease rate to 1070 units/hr  Next Anti-Xa 1800  [facility]    7/24  Anti-Xa = 0.52 at 1740. Continue heparin gtt at 1070 units/hr. Recheck Anti-Xa in 6 hours. Cyrus Pham PharmD  7/24/2022 7:04 PM    7/25  Anti-Xa = 0.76  Continue heparin gtt at 1070 units/hr. Recheck Anti-Xa in 6 hours.   Abdon Bernal Pharm D.7/25/2022 2:02 AM

## 2022-07-24 LAB
A/G RATIO: 1.5 (ref 1.1–2.2)
ALBUMIN SERPL-MCNC: 3.8 G/DL (ref 3.4–5)
ALP BLD-CCNC: 98 U/L (ref 40–129)
ALT SERPL-CCNC: 89 U/L (ref 10–40)
ANION GAP SERPL CALCULATED.3IONS-SCNC: 12 MMOL/L (ref 3–16)
ANTI-XA UNFRAC HEPARIN: 0.52 IU/ML (ref 0.3–0.7)
ANTI-XA UNFRAC HEPARIN: 0.8 IU/ML (ref 0.3–0.7)
ANTI-XA UNFRAC HEPARIN: 0.84 IU/ML (ref 0.3–0.7)
AST SERPL-CCNC: 65 U/L (ref 15–37)
BASOPHILS ABSOLUTE: 0.1 K/UL (ref 0–0.2)
BASOPHILS RELATIVE PERCENT: 0.6 %
BILIRUB SERPL-MCNC: 1.1 MG/DL (ref 0–1)
BUN BLDV-MCNC: 21 MG/DL (ref 7–20)
CALCIUM SERPL-MCNC: 9.4 MG/DL (ref 8.3–10.6)
CHLORIDE BLD-SCNC: 103 MMOL/L (ref 99–110)
CO2: 20 MMOL/L (ref 21–32)
CREAT SERPL-MCNC: 0.9 MG/DL (ref 0.8–1.3)
EOSINOPHILS ABSOLUTE: 0.1 K/UL (ref 0–0.6)
EOSINOPHILS RELATIVE PERCENT: 1 %
ESTIMATED AVERAGE GLUCOSE: 182.9 MG/DL
FIBRINOGEN: 472 MG/DL (ref 207–509)
GFR AFRICAN AMERICAN: >60
GFR NON-AFRICAN AMERICAN: >60
GLUCOSE BLD-MCNC: 164 MG/DL (ref 70–99)
GLUCOSE BLD-MCNC: 168 MG/DL (ref 70–99)
GLUCOSE BLD-MCNC: 179 MG/DL (ref 70–99)
GLUCOSE BLD-MCNC: 205 MG/DL (ref 70–99)
GLUCOSE BLD-MCNC: 249 MG/DL (ref 70–99)
HBA1C MFR BLD: 8 %
HCT VFR BLD CALC: 37.6 % (ref 40.5–52.5)
HEMOGLOBIN: 12.9 G/DL (ref 13.5–17.5)
INR BLD: 1.1 (ref 0.87–1.14)
LYMPHOCYTES ABSOLUTE: 3.3 K/UL (ref 1–5.1)
LYMPHOCYTES RELATIVE PERCENT: 30.3 %
MCH RBC QN AUTO: 30 PG (ref 26–34)
MCHC RBC AUTO-ENTMCNC: 34.4 G/DL (ref 31–36)
MCV RBC AUTO: 87.3 FL (ref 80–100)
MONOCYTES ABSOLUTE: 1 K/UL (ref 0–1.3)
MONOCYTES RELATIVE PERCENT: 8.9 %
NEUTROPHILS ABSOLUTE: 6.5 K/UL (ref 1.7–7.7)
NEUTROPHILS RELATIVE PERCENT: 59.2 %
PDW BLD-RTO: 14.4 % (ref 12.4–15.4)
PERFORMED ON: ABNORMAL
PLATELET # BLD: 156 K/UL (ref 135–450)
PMV BLD AUTO: 6.3 FL (ref 5–10.5)
POTASSIUM REFLEX MAGNESIUM: 4.3 MMOL/L (ref 3.5–5.1)
PROTHROMBIN TIME: 14 SEC (ref 11.7–14.5)
RBC # BLD: 4.31 M/UL (ref 4.2–5.9)
SODIUM BLD-SCNC: 135 MMOL/L (ref 136–145)
TOTAL PROTEIN: 6.3 G/DL (ref 6.4–8.2)
WBC # BLD: 10.9 K/UL (ref 4–11)

## 2022-07-24 PROCEDURE — 2700000000 HC OXYGEN THERAPY PER DAY

## 2022-07-24 PROCEDURE — 36415 COLL VENOUS BLD VENIPUNCTURE: CPT

## 2022-07-24 PROCEDURE — C9113 INJ PANTOPRAZOLE SODIUM, VIA: HCPCS | Performed by: HOSPITALIST

## 2022-07-24 PROCEDURE — 6370000000 HC RX 637 (ALT 250 FOR IP): Performed by: HOSPITALIST

## 2022-07-24 PROCEDURE — 85384 FIBRINOGEN ACTIVITY: CPT

## 2022-07-24 PROCEDURE — 80053 COMPREHEN METABOLIC PANEL: CPT

## 2022-07-24 PROCEDURE — 6360000002 HC RX W HCPCS: Performed by: HOSPITALIST

## 2022-07-24 PROCEDURE — 99223 1ST HOSP IP/OBS HIGH 75: CPT | Performed by: INTERNAL MEDICINE

## 2022-07-24 PROCEDURE — 2060000000 HC ICU INTERMEDIATE R&B

## 2022-07-24 PROCEDURE — 2580000003 HC RX 258: Performed by: HOSPITALIST

## 2022-07-24 PROCEDURE — 94761 N-INVAS EAR/PLS OXIMETRY MLT: CPT

## 2022-07-24 PROCEDURE — 85610 PROTHROMBIN TIME: CPT

## 2022-07-24 PROCEDURE — 85025 COMPLETE CBC W/AUTO DIFF WBC: CPT

## 2022-07-24 PROCEDURE — 99222 1ST HOSP IP/OBS MODERATE 55: CPT | Performed by: SURGERY

## 2022-07-24 PROCEDURE — 99222 1ST HOSP IP/OBS MODERATE 55: CPT | Performed by: INTERNAL MEDICINE

## 2022-07-24 PROCEDURE — 85520 HEPARIN ASSAY: CPT

## 2022-07-24 PROCEDURE — 2580000003 HC RX 258: Performed by: SURGERY

## 2022-07-24 RX ORDER — FAMOTIDINE 20 MG/1
20 TABLET, FILM COATED ORAL DAILY
COMMUNITY

## 2022-07-24 RX ORDER — SODIUM CHLORIDE 9 MG/ML
INJECTION, SOLUTION INTRAVENOUS CONTINUOUS
Status: DISCONTINUED | OUTPATIENT
Start: 2022-07-24 | End: 2022-07-25

## 2022-07-24 RX ADMIN — GABAPENTIN 600 MG: 300 CAPSULE ORAL at 21:33

## 2022-07-24 RX ADMIN — PANTOPRAZOLE SODIUM 40 MG: 40 INJECTION, POWDER, FOR SOLUTION INTRAVENOUS at 08:36

## 2022-07-24 RX ADMIN — SPIRONOLACTONE 25 MG: 25 TABLET ORAL at 08:37

## 2022-07-24 RX ADMIN — INSULIN LISPRO 2 UNITS: 100 INJECTION, SOLUTION INTRAVENOUS; SUBCUTANEOUS at 13:41

## 2022-07-24 RX ADMIN — ASPIRIN 81 MG: 81 TABLET, COATED ORAL at 08:36

## 2022-07-24 RX ADMIN — SODIUM CHLORIDE: 9 INJECTION, SOLUTION INTRAVENOUS at 14:49

## 2022-07-24 RX ADMIN — Medication 10 ML: at 08:37

## 2022-07-24 RX ADMIN — ATORVASTATIN CALCIUM 80 MG: 40 TABLET, FILM COATED ORAL at 21:33

## 2022-07-24 RX ADMIN — Medication 10 ML: at 21:33

## 2022-07-24 RX ADMIN — CARVEDILOL 12.5 MG: 3.12 TABLET, FILM COATED ORAL at 08:37

## 2022-07-24 RX ADMIN — HEPARIN SODIUM 1150 UNITS/HR: 10000 INJECTION, SOLUTION INTRAVENOUS at 11:15

## 2022-07-24 RX ADMIN — LISINOPRIL 10 MG: 10 TABLET ORAL at 08:36

## 2022-07-24 RX ADMIN — INSULIN GLARGINE 23 UNITS: 100 INJECTION, SOLUTION SUBCUTANEOUS at 21:36

## 2022-07-24 RX ADMIN — TAMSULOSIN HYDROCHLORIDE 0.4 MG: 0.4 CAPSULE ORAL at 21:33

## 2022-07-24 RX ADMIN — CARVEDILOL 12.5 MG: 3.12 TABLET, FILM COATED ORAL at 18:01

## 2022-07-24 ASSESSMENT — PAIN DESCRIPTION - LOCATION: LOCATION: MOUTH

## 2022-07-24 ASSESSMENT — ENCOUNTER SYMPTOMS: TACHYPNEA: 1

## 2022-07-24 NOTE — CONSULTS
VASCULAR SURGERY CONSULTATION    Humza Gan is a 69 y.o. male admitted last night with sudden onset of SOB and severe dypsnea on exertion. No previous such symptoms. No H/O VTE or clotting disorders. CTPA demonstrated large bilateral PEs at B PA bifurcations + strain. Pt feels improved now without chest pain and slight blood stained sputum. Asked by Padmini Pedersen and Guera to see pt for evaluation and treatment recommendations. No recent immobilization, trips, surgery or Ca diagnosis. No leg complaints. Pt just ate a full solid lunch. Past Medical History:   Diagnosis Date    Acute saddle pulmonary embolism (HCC)     CAD (coronary artery disease)     Dr Dave Pedersen, hx of stent    Diabetes mellitus ([facility] [address]     Hyperlipidemia     Hypertension     MI (myocardial infarction) [facility])      Past Surgical History:   Procedure Laterality Date    APPENDECTOMY      CORONARY ANGIOPLASTY WITH STENT PLACEMENT      MUSCLE REPAIR      SHOULDER SURGERY       History reviewed. No pertinent family history.   Social History     Socioeconomic History    Marital status:      Spouse name: None    Number of children: None    Years of education: None    Highest education level: None   Tobacco Use    Smoking status: Former    Smokeless tobacco: Never   Substance and Sexual Activity    Alcohol use: Yes     Comment: rarely    Drug use: No     Current Facility-Administered Medications   Medication Dose Route Frequency Provider Last Rate Last Admin    perflutren lipid microspheres (DEFINITY) injection 1.65 mg  1.5 mL IntraVENous ONCE PRN Fallon Jaime MD        heparin (porcine) injection 6,600 Units  6,600 Units IntraVENous PRN ALEIDA Park        heparin (porcine) injection 3,300 Units  3,300 Units IntraVENous PRN ALEIDA Park        heparin 25,000 units in dextrose 5% 250 mL (premix) infusion  1,070 Units/hr IntraVENous Continuous Fallon Jaime MD 10.7 mL/hr at 07/24/22 1157 1,070 Units/hr at 07/24/22 1157    LORazepam (ATIVAN) tablet 0.5 mg  0.5 mg Oral Q6H VIRGIL Gonzalez MD        carvedilol (COREG) tablet 12.5 mg  12.5 mg Oral BID KORY Gonzalez MD   12.5 mg at 07/24/22 0049    aspirin EC tablet 81 mg  81 mg Oral Daily Buffy Gonzalez MD   81 mg at 07/24/22 0836    atorvastatin (LIPITOR) tablet 80 mg  80 mg Oral Nightly Buffy Gonzalez MD        gabapentin (NEURONTIN) capsule 600 mg  600 mg Oral Nightly Buffy Gonzalez MD   600 mg at 07/23/22 2121    lisinopril (PRINIVIL;ZESTRIL) tablet 10 mg  10 mg Oral Daily Buffy Gonzalez MD   10 mg at 07/24/22 9250    spironolactone (ALDACTONE) tablet 25 mg  25 mg Oral Daily Buffy Gonzalez MD   25 mg at 07/24/22 0837    tamsulosin (FLOMAX) capsule 0.4 mg  0.4 mg Oral Nightgin Gonzalez MD   0.4 mg at 07/23/22 2121    glucose chewable tablet 16 g  4 tablet Oral PRN Buffy Gonzalez MD        dextrose bolus 10% 125 mL  125 mL IntraVENous VIRGIL Gonzalez MD        Or    dextrose bolus 10% 250 mL  250 mL IntraVENous VIRGIL Gonzalez MD        glucagon (rDNA) injection 1 mg  1 mg SubCUTAneous PRCARLA Gonzalez MD        dextrose 10 % infusion   IntraVENous Continuous VIRGIL Gonzalez MD        insulin glargine (LANTUS) injection vial 23 Units  0.25 Units/kg SubCUTAneous Nightgin Gonzalez MD   23 Units at 07/23/22 2116    insulin lispro (HUMALOG) injection vial 0-8 Units  0-8 Units SubCUTAneous TID KORY Gonzalez MD        insulin lispro (HUMALOG) injection vial 0-4 Units  0-4 Units SubCUTAneous Nightly Buffy Gonzalez MD        pantoprazole (PROTONIX) injection 40 mg  40 mg IntraVENous Daily Buffy Gonzalez MD   40 mg at 07/24/22 0836    sodium chloride flush 0.9 % injection 10 mL  10 mL IntraVENous 2 times per day Buffy Gonzalez MD   10 mL at 07/24/22 0837    sodium chloride flush 0.9 % injection 10 mL  10 mL IntraVENous VIRGIL Gonzalez MD        0.9 % sodium chloride infusion   IntraVENous PRCARLA Gonzalez MD        potassium chloride (KLOR-CON M) extended release tablet 40 mEq  40 mEq Oral PRN Buffy Gonzalez MD        Or    potassium bicarb-citric acid (EFFER-K) effervescent tablet 40 mEq  40 mEq Oral PRN Buffy Gonzalez MD        Or    potassium chloride 10 mEq/100 mL IVPB (Peripheral Line)  10 mEq IntraVENous VIRGIL Gonzalez MD        magnesium sulfate 2000 mg in 50 mL IVPB premix  2,000 mg IntraVENous PRCARLA Gonzalez MD        promethazine (PHENERGAN) tablet 12.5 mg  12.5 mg Oral Q6H PRCARLA Gonzalez MD        Or    ondansetron [facility]) injection 4 mg  4 mg IntraVENous Q6H PRCARLA Gonzalez MD        senna (SENOKOT) tablet 8.6 mg  1 tablet Oral Daily PRCARLA Gonzalez MD        acetaminophen (TYLENOL) tablet 650 mg  650 mg Oral Q6H PRCARLA Gonzalez MD   650 mg at 07/23/22 2121    Or    acetaminophen (TYLENOL) suppository 650 mg  650 mg Rectal Q6H PRCARLA Gonzalez MD         No current facility-administered medications on file prior to encounter. Current Outpatient Medications on File Prior to Encounter   Medication Sig Dispense Refill    famotidine (PEPCID) 20 MG tablet Take 20 mg by mouth in the morning. empagliflozin (JARDIANCE) 25 MG tablet Take 25 mg by mouth in the morning. ondansetron (ZOFRAN ODT) 4 MG disintegrating tablet Take 1 tablet by mouth every 8 hours as needed for Nausea 20 tablet 0    nitroGLYCERIN (NITROSTAT) 0.4 MG SL tablet Place 1 tablet under the tongue every 5 minutes as needed for Chest pain up to max of 3 total doses. If no relief after 1 dose, call 911. 25 tablet 3    alogliptin (NESINA) 25 MG TABS tablet Take 25 mg by mouth daily      gabapentin (NEURONTIN) 600 MG tablet Take 600 mg by mouth 2 times daily.       atorvastatin (LIPITOR) 80 MG tablet Take 1 tablet by mouth nightly 30 tablet 3    lisinopril (PRINIVIL;ZESTRIL) 5 MG tablet Take 1 tablet by mouth daily 30 tablet 3    carvedilol (COREG) 6.25 MG tablet Take 1 tablet by mouth 2 times daily (with meals) 60 tablet 3    aspirin 81 MG chewable tablet Take 1 tablet by mouth daily 30 tablet 3    glipiZIDE (GLUCOTROL) 10 MG tablet Take 5 mg by mouth 2 times daily (before meals)      metFORMIN (GLUCOPHAGE) 1000 MG tablet Take 1,000 mg by mouth Daily with lunch      tamsulosin (FLOMAX) 0.4 MG capsule Take 0.4 mg by mouth daily       Allergies   Allergen Reactions    Ciprofloxacin        Review of Systems  Pertinent items are noted in HPI. Ca screening up to date at [facility]. Objective:     BP 98/66   Pulse 82   Temp 97.3 °F (36.3 °C) (Oral)   Resp 22   Ht 6' (1.829 m)   Wt 200 lb (90.7 kg)   SpO2 96%   BMI 27.12 kg/m²     General:  alert, appears stated age, cooperative, and mild distress with dyspnea while speaking   Skin:  normal   Eyes: conjunctivae/corneas clear. PERRL, EOM's intact. Fundi benign. Mouth: MMM no lesions   Lymph Nodes:  Cervical, supraclavicular, and axillary nodes normal.   Lungs:  clear to auscultation bilaterally   Heart:  regular rate and rhythm, S1, S2 normal, no murmur, click, rub or gallop   Abdomen: soft, non-tender; bowel sounds normal; no masses,  no organomegaly   CVA:  absent   Genitourinary: defer exam   Extremities:  extremities normal, atraumatic, no cyanosis or edema    Pulses:   R bruit  L bruit   2   carotid 2    2   brachial 2    2   radial 2    2   femoral 2    2   popliteal 2    2   posterior tibial 2    2   dorsalis pedis 2    na   bypass graft na         Neurologic:  negative   Psychiatric:  non focal       Labs reviewed    CTPA 7/23/2022 - personally reviewed:  B PEs at B PA bifurcations - significant volume  Impression   1. Acute bilateral pulmonary embolism. Additional evidence of right   ventricular strain. 2. The lungs are clear with mild granulomatous change incidentally noted on   the right.      Assessment:     1) Symptomatic acute B pulmonary emboli with clinical RV strain - tolerated and hemodynamically stable   2) CAD  3) DM  4) HTN     Rec:     Agree with full heparin anticoagulation - eventual po AC (DOAC). Hypercoag w/u would seem appropriate in this case of unprovoked VTE. Recommended catheter directed EKOS thrombolysis for acute symptom control and reduction in long term sequalae. Risks of thrombolysis explained in detail (bleeding, catheter related complications, failure). Family & pt understand and agree to proceed as long as fibrinogen appropriate (pending). Since pt just now consumed a full lunch and he is hemodynamically stable can delay procedure until tomorrow AM unless he develops worsening symptoms. Keep NPO now through the AM and continue heparin drip and will DC on call to cath lab. Thanks for this consultation and will follow.     Richard Kiser

## 2022-07-24 NOTE — CONSULTS
Pulmonary & Critical Care   History and Physical       Patient Name:  Susan Gallagher ADMISSION/CC:    PCP: Med Guerra    HISTORY OF PRESENT ILLNESS:   69y.o. year old male who is known case of hypertension, type 2 diabetes, coronary artery disease had a stent placed many years ago who was doing well until the day of his admission when he started feeling tired and he suddenly passed and he does not remember exactly for how long but he was when he wake up he was tired and he denied any chest pain no hemoptysis no orthopnea no PND no edema of the lower extremities the patient has no history of recent travel no history of cancer no recent surgeries no family history of DVTs or PEs. The patient smoked for few years and quit like 4 years ago. 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     Social History     Tobacco Use    Smoking status: Former    Smokeless tobacco: Never   Substance Use Topics    Alcohol use: Yes     Comment: rarely        Meds:    Current Facility-Administered Medications:     perflutren lipid microspheres (DEFINITY) injection 1.65 mg, 1.5 mL, IntraVENous, ONCE PRN, Kandace Duval MD    heparin (porcine) injection 6,600 Units, 6,600 Units, IntraVENous, PRN, ALEIDA Capellan    heparin (porcine) injection 3,300 Units, 3,300 Units, IntraVENous, PRN, ALEIDA Capellan    heparin 25,000 units in dextrose 5% 250 mL (premix) infusion, 1,150 Units/hr, IntraVENous, Continuous, Star Parsons MD, Last Rate: 11.5 mL/hr at 07/24/22 0516, 1,150 Units/hr at 07/24/22 0516    LORazepam (ATIVAN) tablet 0.5 mg, 0.5 mg, Oral, Q6H PRN, Sea Krishnan MD    carvedilol (COREG) tablet 12.5 mg, 12.5 mg, Oral, BID WC, Sea Krishnan MD, 12.5 mg at 07/24/22 2611    aspirin EC tablet 81 mg, 81 mg, Oral, Daily, Sea Kirshnan MD, 81 mg at 07/24/22 0836    atorvastatin (LIPITOR) tablet 80 mg, 80 mg, Oral, Nightly, Sea Krishnan MD    gabapentin (NEURONTIN) capsule 600 mg, 600 mg, Oral, Nightly, Sea Krishnan MD, 600 mg at 07/23/22 2121    lisinopril (PRINIVIL;ZESTRIL) tablet 10 mg, 10 mg, Oral, Daily, Sea Krishnan MD, 10 mg at 07/24/22 2611    spironolactone (ALDACTONE) tablet 25 mg, 25 mg, Oral, Daily, Sea Krishnan MD, 25 mg at 07/24/22 7132    tamsulosin (FLOMAX) capsule 0.4 mg, 0.4 mg, Oral, Nightly, Sea Krishnan MD, 0.4 mg at 07/23/22 2121    glucose chewable tablet 16 g, 4 tablet, Oral, PRN, Sea Krishnan MD    dextrose bolus 10% 125 mL, 125 mL, IntraVENous, PRN **OR** dextrose bolus 10% 250 mL, 250 mL, IntraVENous, PRN, Sea Krishnan MD    glucagon (rDNA) injection 1 mg, 1 mg, SubCUTAneous, PRN, Sea Krishnan MD    dextrose 10 % infusion, , IntraVENous, Continuous PRN, Sea Krishnan MD    insulin glargine (LANTUS) injection vial 23 Units, 0.25 Units/kg, SubCUTAneous, Nightly, Sea Krishnan MD, 23 Units at 07/23/22 2116    insulin lispro (HUMALOG) injection vial 0-8 Units, 0-8 Units, SubCUTAneous, TID WC, Sea Krishnan MD    insulin lispro (HUMALOG) injection vial 0-4 Units, 0-4 Units, SubCUTAneous, Nightly, Sea Krishnan MD    pantoprazole (PROTONIX) injection 40 mg, 40 mg, IntraVENous, Daily, Sea Krishnan MD, 40 mg at 07/24/22 0836    sodium chloride flush 0.9 % injection 10 mL, 10 mL, IntraVENous, 2 times per day, Sea Krishnan MD, 10 mL at 07/24/22 0837    sodium chloride flush 0.9 % injection 10 mL, 10 mL, IntraVENous, PRN, Sea Krishnan MD    0.9 % sodium chloride infusion, , IntraVENous, PRN, Aguilar Walsh MD    potassium chloride (KLOR-CON M) extended release tablet 40 mEq, 40 mEq, Oral, PRN **OR** potassium bicarb-citric acid (EFFER-K) effervescent tablet 40 mEq, 40 mEq, Oral, PRN **OR** potassium chloride 10 mEq/100 mL IVPB (Peripheral Line), 10 mEq, IntraVENous, PRNAguilar MD    magnesium sulfate 2000 mg in 50 mL IVPB premix, 2,000 mg, IntraVENous, PRNAguilar MD    promethazine (PHENERGAN) tablet 12.5 mg, 12.5 mg, Oral, Q6H PRN **OR** ondansetron (ZOFRAN) injection 4 mg, 4 mg, IntraVENous, Q6H PRNAguilar MD    senna (SENOKOT) tablet 8.6 mg, 1 tablet, Oral, Daily PRNAguilar MD    acetaminophen (TYLENOL) tablet 650 mg, 650 mg, Oral, Q6H PRN, 650 mg at 07/23/22 2121 **OR** acetaminophen (TYLENOL) suppository 650 mg, 650 mg, Rectal, Q6H PRN, Aguilar Walsh MD     Continuous Infusions:   heparin (PORCINE) Infusion 1,150 Units/hr (07/24/22 0516)    dextrose      sodium chloride         PRN Meds:  perflutren lipid microspheres, heparin (porcine), heparin (porcine), LORazepam, glucose, dextrose bolus **OR** dextrose bolus, glucagon (rDNA), dextrose, sodium chloride flush, sodium chloride, potassium chloride **OR** potassium alternative oral replacement **OR** potassium chloride, magnesium sulfate, promethazine **OR** ondansetron, senna, acetaminophen **OR** acetaminophen    Allergies:  Patient is allergic to ciprofloxacin. REVIEW OF SYSTEMS:  Review of Systems  All systems have been reviewed and they have been negative except what stated above in HPI  I personally reviewed the patient's medication list, medical and surgical history, and updated as needed. Objective:   EXAM:  /77   Pulse 87   Temp 97.4 °F (36.3 °C) (Oral)   Resp 24   Ht 6' (1.829 m)   Wt 200 lb (90.7 kg)   SpO2 94%   BMI 27.12 kg/m²    Physical Exam   GENERAL APPEARANCE: Awake and alert. Cooperative. No acute distress.   HEAD: Normocephalic. Cut over his lower lip from recent. No hematomas, lesions, lacerations or abrasions. Negative for chowdhury signs or raccoon's eyes. EYES: PERRL. EOM's grossly intact. No periorbital edema or erythema. No proptosis. No globe tenderness. No blood noted to anterior chambers. ENT: Mucous membranes are moist.  Patient with soft tissue swelling and discomfort to upper lip. The right front tooth is loose appearing. No TMJ pain or malocclusion. No pain at nasal bridge or any additional facial tenderness. Dried blood noted in the nares bilaterally. No active bleeding. No epistaxis. Estil Jauregui NECK: Supple. No midline bony tenderness. No crepitus, deformity, or step-offs noted. No swelling, bruising, or color change. HEART: Tachycardic. No murmurs. No chest wall tenderness. LUNGS: Respirations unlabored. CTAB. Good air exchange. Speaking comfortably in full sentences. No wheezing, rhonchi, rales. ABDOMEN: Soft. Non-distended. Non-tender. No guarding or rebound. Negative Hathaway's, McBurney's and Rovsing's. No fluids or ascites. No hernias or masses. Bowel sounds normal in all quadrants. No CVA tenderness. EXTREMITIES: No peripheral edema. No unilateral calf pain, redness or swelling. Moves all extremities equally. All extremities neurovascularly intact. SKIN: Warm and dry. No acute rashes. NEUROLOGICAL: Alert and oriented. CN's 2-12 intact. No gross facial drooping. Strength 5/5, sensation intact. PSYCHIATRIC: Normal mood and affect.     Data Reviewed:   LABS:  :   Recent Labs     07/23/22  1151 07/24/22 0312   WBC 12.2* 10.9   HGB 14.5 12.9*   HCT 44.1 37.6*   MCV 88.8 87.3    156     BMP:   Recent Labs     07/23/22  1151 07/24/22 0312    135*   K 4.5 4.3   CL 98* 103   CO2 20* 20*   BUN 20 21*   CREATININE 1.0 0.9     PROFILE:   Recent Labs     07/23/22  1151 07/24/22 0312   * 65*   * 89*   BILITOT 1.5* 1.1*   ALKPHOS 123 98     PT/INR: No results for input(s): PROTIME, INR in the last 72 hours. APTT:  Recent Labs     07/23/22  1450   APTT 29.9     :No results for input(s): NITRITE, COLORU, PHUR, LABCAST, WBCUA, RBCUA, MUCUS, TRICHOMONAS, YEAST, BACTERIA, CLARITYU, SPECGRAV, LEUKOCYTESUR, UROBILINOGEN, BILIRUBINUR, BLOODU, GLUCOSEU, AMORPHOUS in the last 72 hours. Invalid input(s): KETONESU  No results for input(s): PHART, [id number], PO2ART in the last 72 hours. Plan:      Submassive PE with signs of RV strain on CT scan of the chest it is unprovoked PE he is currently on full anticoagulation/heparin . I recommend vascular surgical consult for EKOS as that we will lower his right heart pressures although there is no proven survival benefit. The patient requires to be on full anticoagulation for extended period of time recommend to switch anticoagulation upon discharge to Eliquis 10 mg twice daily for a week then 5 mg twice daily.     No indication for surgical embolectomy or IVC filter          Nolan Moralez MD  7/24/2022  10:00 AM

## 2022-07-24 NOTE — CONSULTS
[facility]  [phone]      Attending Physician: Riki Lewis MD  Reason for Consultation/Chief Complaint: Shortness of breath    Subjective   History of Present Illness:  Marleni Mercer is a 69 y.o. patient who presented to the hospital with complaints of shortness of breath, patient also noted sweating and this occurred suddenly on the day of presentation on July 23, 2022. This ultimately culminated in lightheadedness and passing out although he has very limited recollection of events, he thinks he may have had some chest pressure along with dizziness as well. He presented to the hospital yesterday was admitted and is found to have saddle acute pulmonary emboli. He is been treated with heparin drip. He was noted to have injuries to his face as he fell forward. His family is at the bedside able to provide/confirm this history. He had multilevel CT scans performed which did not show any fracture or injury other than superficial injuries. He feels he may have had a laceration to his lip and mouth area as well as loosened tooth. Currently, he says he feels much better. He does not have any prior history of venous thromboembolism. Chronically, he does have a history of diabetes, hypertension, hypercholesterolemia, and he has a cardiac history which dates back to 2019 when he presented with an acute inferior STEMI and underwent cardiac catheterization but was managed medically due to small coronaries. He did have follow-up catheterizations in 2019 and unsuccessful attempt was made at PDA PCI but he ultimately had staged LAD PCI. He last followed up in our office in 2020 but ultimately is been following up with the [place] and he says he been stable with guarded his chronic medical conditions. Has had no recent change in his health, no recent travel or trauma/injury. In the course of hospital evaluation, troponin levels found to be elevated 0.19.     Past Medical History:   has a past medical history of Acute saddle pulmonary embolism ([facility] [address] CAD (coronary artery disease), Diabetes mellitus ([facility] [address] Hyperlipidemia, Hypertension, and MI (myocardial infarction) ([facility] [address] Surgical History:   has a past surgical history that includes Muscle Repair; Appendectomy; shoulder surgery; and Coronary angioplasty with stent. Social History:   reports that he has quit smoking. He has never used smokeless tobacco. He reports current alcohol use. He reports that he does not use drugs. Home Medications:  Were reviewed and are listed in nursing record and/or below  Prior to Admission medications    Medication Sig Start Date End Date Taking? Authorizing Provider   famotidine (PEPCID) 20 MG tablet Take 20 mg by mouth in the morning. Yes Historical Provider, MD   ondansetron (ZOFRAN ODT) 4 MG disintegrating tablet Take 1 tablet by mouth every 8 hours as needed for Nausea 8/29/21   Froy Valero MD   dicyclomine (BENTYL) 10 MG capsule Take 1 capsule by mouth every 6 hours as needed (cramps)  Patient not taking: Reported on 7/24/2022 8/29/21   Froy Valero MD   clopidogrel (PLAVIX) 75 MG tablet Take 75 mg by mouth daily  Patient not taking: Reported on 7/24/2022    Historical Provider, MD   nitroGLYCERIN (NITROSTAT) 0.4 MG SL tablet Place 1 tablet under the tongue every 5 minutes as needed for Chest pain up to max of 3 total doses. If no relief after 1 dose, call 911. 10/18/19   LENORE Nava CNP   alogliptin (NESINA) 25 MG TABS tablet Take 25 mg by mouth daily    Historical Provider, MD   baclofen (LIORESAL) 10 MG tablet Take 10 mg by mouth 3 times daily as needed (as needed for muscle spasms)  Patient not taking: Reported on 7/24/2022    Historical Provider, MD   gabapentin (NEURONTIN) 600 MG tablet Take 600 mg by mouth 2 times daily.     Historical Provider, MD   atorvastatin (LIPITOR) 80 MG tablet Take 1 tablet by mouth nightly 10/5/19   LENORE Leal - CNP lisinopril (PRINIVIL;ZESTRIL) 5 MG tablet Take 1 tablet by mouth daily 10/6/19   LENORE Brady CNP   carvedilol (COREG) 6.25 MG tablet Take 1 tablet by mouth 2 times daily (with meals) 10/5/19   LENORE Brady CNP   aspirin 81 MG chewable tablet Take 1 tablet by mouth daily 10/6/19   LENORE Brady CNP   glipiZIDE (GLUCOTROL) 10 MG tablet Take 5 mg by mouth 2 times daily (before meals)    Historical Provider, MD   metFORMIN (GLUCOPHAGE) 1000 MG tablet Take 1,000 mg by mouth Daily with lunch    Historical Provider, MD   tamsulosin (FLOMAX) 0.4 MG capsule Take 0.4 mg by mouth daily    Historical Provider, MD        CURRENT Medications:  perflutren lipid microspheres (DEFINITY) injection 1.65 mg, ONCE PRN  heparin (porcine) injection 6,600 Units, PRN  heparin (porcine) injection 3,300 Units, PRN  heparin 25,000 units in dextrose 5% 250 mL (premix) infusion, Continuous  LORazepam (ATIVAN) tablet 0.5 mg, Q6H PRN  carvedilol (COREG) tablet 12.5 mg, BID WC  aspirin EC tablet 81 mg, Daily  atorvastatin (LIPITOR) tablet 80 mg, Nightly  gabapentin (NEURONTIN) capsule 600 mg, Nightly  lisinopril (PRINIVIL;ZESTRIL) tablet 10 mg, Daily  spironolactone (ALDACTONE) tablet 25 mg, Daily  tamsulosin (FLOMAX) capsule 0.4 mg, Nightly  glucose chewable tablet 16 g, PRN  dextrose bolus 10% 125 mL, PRN   Or  dextrose bolus 10% 250 mL, PRN  glucagon (rDNA) injection 1 mg, PRN  dextrose 10 % infusion, Continuous PRN  insulin glargine (LANTUS) injection vial 23 Units, Nightly  insulin lispro (HUMALOG) injection vial 0-8 Units, TID WC  insulin lispro (HUMALOG) injection vial 0-4 Units, Nightly  pantoprazole (PROTONIX) injection 40 mg, Daily  sodium chloride flush 0.9 % injection 10 mL, 2 times per day  sodium chloride flush 0.9 % injection 10 mL, PRN  0.9 % sodium chloride infusion, PRN  potassium chloride (KLOR-CON M) extended release tablet 40 mEq, PRN   Or  potassium bicarb-citric acid (EFFER-K) effervescent tablet 40 mEq, PRN   Or  potassium chloride 10 mEq/100 mL IVPB (Peripheral Line), PRN  magnesium sulfate 2000 mg in 50 mL IVPB premix, PRN  promethazine (PHENERGAN) tablet 12.5 mg, Q6H PRN   Or  ondansetron (ZOFRAN) injection 4 mg, Q6H PRN  senna (SENOKOT) tablet 8.6 mg, Daily PRN  acetaminophen (TYLENOL) tablet 650 mg, Q6H PRN   Or  acetaminophen (TYLENOL) suppository 650 mg, Q6H PRN        Allergies:  Ciprofloxacin     Review of Systems:   A 14 point review of symptoms completed. Pertinent positives identified in the HPI, all other review of symptoms negative as below. Objective   PHYSICAL EXAM:    Vitals:    07/24/22 1116   BP: 98/66   Pulse: 82   Resp: 22   Temp: 97.3 °F (36.3 °C)   SpO2: 96%    Weight: 200 lb (90.7 kg)         General Appearance:  Alert, cooperative, no distress, appears stated age, able to lie flat and speak in full sentences. Head:  Normocephalic, has trauma to the face with particular the right side of his mouth and lip, there is dried blood with slight oozing noted.    Eyes:  PERRL, conjunctiva/corneas clear   Nose: Nares normal, no drainage or sinus tenderness   Throat: As noted above   Neck: Supple, no JVP   Lungs:   Clear to auscultation bilaterally, respirations unlabored   Chest Wall:  No deformity or tenderness   Heart:  Regular rate and rhythm, S1, S2 normal, no murmur, rub or gallop   Abdomen:   Soft, non-tender, bowel sounds active all four quadrants,  no masses, no organomegaly   Extremities: Extremities normal, atraumatic, no cyanosis or edema   Pulses: 2+ and symmetric   Skin: Skin color, texture, turgor normal, no rashes or lesions   Pysch: Normal mood and affect   Neurologic: Normal gross motor and sensory exam.         Labs   CBC:   Lab Results   Component Value Date/Time    WBC 10.9 07/24/2022 03:12 AM    RBC 4.31 07/24/2022 03:12 AM    HGB 12.9 07/24/2022 03:12 AM    HCT 37.6 07/24/2022 03:12 AM    MCV 87.3 07/24/2022 03:12 AM    RDW 14.4 07/24/2022 03:12 AM     07/24/2022 03:12 AM     CMP:  Lab Results   Component Value Date/Time     07/24/2022 03:12 AM    K 4.3 07/24/2022 03:12 AM     07/24/2022 03:12 AM    CO2 20 07/24/2022 03:12 AM    BUN 21 07/24/2022 03:12 AM    CREATININE 0.9 07/24/2022 03:12 AM    GFRAA >60 07/24/2022 03:12 AM    AGRATIO 1.5 07/24/2022 03:12 AM    LABGLOM >60 07/24/2022 03:12 AM    GLUCOSE 164 07/24/2022 03:12 AM    PROT 6.3 07/24/2022 03:12 AM    CALCIUM 9.4 07/24/2022 03:12 AM    BILITOT 1.1 07/24/2022 03:12 AM    ALKPHOS 98 07/24/2022 03:12 AM    AST 65 07/24/2022 03:12 AM    ALT 89 07/24/2022 03:12 AM     PT/INR:  No results found for: PTINR  HgBA1c:  Lab Results   Component Value Date    LABA1C 8.0 07/23/2022     Lab Results   Component Value Date    TROPONINI 0.19 (H) 07/23/2022         Cardiac Data     Last EKG: Sinus tachycardia, possible RV conduction delay      Recent Testing:  Echo 10/3/19  Conclusions   Summary   The left ventricular systolic function is mildly reduced with an ejection   fraction of 45-50 %. There is hypokinesis of the basal/mid inferior wall. There is mild concentric left ventricular hypertrophy. Grade I diastolic dysfunction with normal left ventricular filling pressure. Cardiac cath 10/3/19  LVGRAM  LVEDP 7   GRADIENT ACROSS AORTIC VALVE None   LV FUNCTION EF 40%   WALL MOTION Mid inferior hypokinesis   MITRAL REGURGITATION Mild         CORONARY ARTERIES     LM <10% stenosis            LAD Heavily calcified. Prox 20-30% stenosis. Mid 60-70% stenosis. Distal 20-30% stenosis. Forms a \"whales tail\" at apex. D1 is large vessel with ostial/prox 30% stenosis. LCX Small to medium sized vessel. Ostial/prox 30-40% stenosis. RCA Dominant. Large vessel. Heavily calcified. Prox 60% stenosis. Mid 30-40% stenosis. There are 3 branches that course as RPDAs. The first two have ostial 90-95% stenoses. The third RPDA (largest vessel) has 60-70% stenosis.   RPLV is large vessel with ostial/prox 40% stenosis. CONCLUSIONS:      Multivessel cad/ashd, treat medically, on dapt, bblocker/ace/statin, spirinolactone  Consider f/u ischemic functional testing, and staged PCI pending clinical response to medical therapy         Cath: 10/16/19  FINDINGS         LVGRAM     LVEDP 7   GRADIENT ACROSS AORTIC VALVE No gradient   LV FUNCTION EF 40-45%   WALL MOTION Mid-distal inferior hypokinesis   MITRAL REGURGITATION Mild         CORONARY ARTERIES     LM <10% stenosis            LAD Heavily calcified. Prox 20-30% stenosis. Mid 60-70%   stenosis. Distal 20-30% stenosis. Forms a \"whales tail\" at   apex. D1 is large vessel with ostial/prox 30% stenosis. LCX Small to medium sized vessel. Ostial/prox 30-40% stenosis. RCA Dominant. Large vessel. Heavily calcified. Prox 60%   stenosis. Mid 30-40% stenosis. There are 3 branches that course   as RPDAs. The first two have ostial 90-95% stenoses (of these, second one has slower flow with more significant stenosis). The third   RPDA (largest vessel) has 60-70% stenosis. RPLV is large vessel   with ostial/prox 40% stenosis. PERCUTANEOUS INTERVENTION DESCRIPTION      Heparin was used for anticoagulation. A 6fr AL 0.75 guide was used along with turnpike LP catheter and multiple wires (evelina blue, fielder xt, cougar, PT2 mod support, fighter,  200) were taken but ostial lesion in 2nd RPDA could not be crossed fully. Lesion was partially crossed but wire appeared to enter a false lumen. Flow in 2nd RPDA was zero following this. As there were no ekg changes or chest pain, it was felt that further attempts were not likely to be successful and procedure was completed. There was 100% residual stenosis. There was RAFY 1 flow before PCI and RAFY 0 flow after PCI. CONCLUSIONS:      Unsuccessful PCI of 2nd RPDA  Continue medical therapy and plan for probable staged PCI of LAD and possibly prox RCA. Cardiac cath 12/4/19  Coronary angioga  Coronary cath  Temporary transvenous pacer insertion  IFR of RCA  IVUS of LAD  Orbital atherectomy of LAD  PCI of LAD with single drug-eluting stent     PROCEDURE DESCRIPTION   Risks/benefits/alternatives/outcomes were discussed with patient and/or family and informed consent was obtained. Using the [facility] scale, the patient's right radial artery was found to be acceptable for cannulation. Patient was prepped draped in the usual sterile fashion. Local anaesthetic was applied over puncture sites. Prior to the procedure a right peripheral antecubital IV was placed and this was exchanged using a microcatheter kit for a 6 Mongolian sheath and then a temporary transvenous pacer was inserted into the RV and capture was demonstrated and this device was placed in a back-up mode. Using a back wall technique, a 6/7 [place] Terumo sheath was inserted into right radial artery. Verapamil, nitroglycerin were administered through the sheath. Heparin was administered. Diagnostic 6 Western An JR 5 guiding catheter as well as 7 Western An XB 3.5 guiding catheters were used for diagnostic angiograms. Axis II JR 5 ostium was obtained by jumping the gap between the JR 5 guiding catheter with a choice floppy wire is a JR 5 guiding catheter was placed just near the ostium. Attention turned towards interventional procedures as noted below. At the conclusion of the procedure, a TR band was placed over the puncture site and hemostasis was obtained. There were no immediate complications. I supervised sedation with versed 5 mg/fentanyl 250 Mcg during the procedure. 215 cc contrast was utilized. <20cc EBL. FINDINGS      CORONARY ARTERIES     LM Less than 10% gafsalgp-irr-cehqnh stenoses         LAD Heavily and severely calcified in the proximal and mid vessels. Approximately 20 to 30% stenosis. Mid 60 to 70% stenosis. Distal 20 to 30% stenosis. Forms a \"whales tail\" at   apex.    D1 is large vessel with ostial/prox 30% stenosis. LCX Seen in limited views, overall appears similar to prior catheterization. Small to medium sized vessel. Ostial/prox 30-40% stenosis. RCA Dominant, large vessel with calcification noted. There is proximal 50 to 60% stenosis. Mid 40 to 50% stenosis. Distal 30 to 40% stenosis. There are a total of 3 PDA vessels. The first two PDA vessels are small and have ostial 80 to 95% stenoses. The second PDA was known to be occluded from the last catheterization during which time there was unsuccessful attempt intervention on this branch vessel, however, this vessel has had spontaneous recannulation. The third PDA has [id number] 20 to 30% stenosis. The our PLV has proximal-mid 30 to 40% stenoses. IFR and PERCUTANEOUS INTERVENTION DESCRIPTION      Heparin was used for anticoagulation, attention turned towards the RCA initially and a 6 Western An JR 5 guiding catheter was taken. This would not directly cannulate the RCA ostium and was placed just up to the RCA ostium and then a choice floppy wire was used to cross the gap and then the guide catheter was advanced into the RCA. Angiography was performed and then attention turned towards IFR of the RCA and the [facility] IFR wire was appropriately zeroed, calibrated and then ultimately normalized in the a sending aorta. IFR measurements showed an average value of 0.96 which was consistent with moderate proximal RCA disease. As such, attention turned towards the left-sided coronary angiography and PCI and equipment was removed from the RCA. A 7 East Timorese XB 3.5 guiding catheter was advanced to the left main ostium and the left main was engaged with this guiding catheter.   A Viper wire was used to cross the LAD lesion, CHETNA was then performed of the LAD which demonstrated a severely calcified LAD with a 360 degree arc of calcium in the mid vessel with an MLD of 2 mm however the full vessel size based on EEM and appear to be closer to 3 mm. Then orbital atherectomy was performed at low speeds on the mid LAD. LAD was then dilated with a 2.25 mm balloon and the LAD was then stented with a Abbott Xience Fanny 2.25 x 38 mm drug-eluting stent. Stents were postdilated with a 2.5 mm noncompliant balloon followed by a 3 mm noncompliant balloon. There was 0% residual stenosis. There was RAFY 3 flow before and after PCI. Procedure was felt to be complete at this juncture and equipment was removed including the temporary transvenous pacer. Venous site was secured for later removal of that sheath     CONCLUSIONS:      Moderate RCA disease in the proximal vessel with negative IFR  Successful PCI of the LAD with orbital atherectomy and single drug-eluting stent     Medical therapy for remaining CAD/ASHD      Studies:     Ct chest    Impression   1. Acute bilateral pulmonary embolism. Additional evidence of right   ventricular strain. 2. The lungs are clear with mild granulomatous change incidentally noted on   the right. I have reviewed labs and imaging/xray/diagnostic testing in this note. Assessment and Plan        Patient Active Problem List   Diagnosis    NSTEMI (non-ST elevated myocardial infarction) (HCC)    HTN (hypertension)    Hyperlipidemia    DM (diabetes mellitus) ([address]    Coronary artery disease involving native coronary artery of native heart with unstable angina pectoris (HCC)    Ischemic cardiomyopathy    Unstable angina (HCC)    ACS (acute coronary syndrome) ([facility])    Other fatigue    SOB (shortness of breath)    Uncontrolled type 2 diabetes mellitus with hyperglycemia ([address]    Acute saddle pulmonary embolism with acute cor pulmonale (HCC)       Elevated troponin, likely related to PE, would agree with treat with heparin and will consult with vascular surgery for consideration of EKOS.   We will also facilitate work-up with hematology consultation in order to see if patient needs a hypercoagulable work-up or cancer screening. This was discussed with patient and family, they understand they will need additional outpatient follow-up including follow-up with her PCP for this. Obtain echocardiogram.  Patient does not require ischemia evaluation as elevated troponin level is likely related to PE.    CAD/ASHD, continue aspirin    Hypercholesteremia, continue statin    Hypertension, continue lisinopril and Coreg, spironolactone    No further inpatient cardiac work-up or treatment is planned, will sign off, please call with questions    Thank you for allowing us to participate in the care of Raisa. Please call me with any questions [phone].     Rivka Jain MD, [address] [facility]   Interventional Cardiologist  [place]   [phone] [facility]  [phone] [address] [place] [address]  7/24/2022 12:10 PM

## 2022-07-24 NOTE — PROGRESS NOTES
Hospitalist Progress Note      PCP: Boris Alexandra    Date of Admission: 7/23/2022    Chief Complaint: LOC    Hospital Course: reviewed     Subjective:  gets very sob when exerting himself but no sob at rest       Medications:  Reviewed    Infusion Medications    heparin (PORCINE) Infusion 1,150 Units/hr (07/24/22 0516)    dextrose      sodium chloride       Scheduled Medications    carvedilol  12.5 mg Oral BID WC    aspirin  81 mg Oral Daily    atorvastatin  80 mg Oral Nightly    gabapentin  600 mg Oral Nightly    lisinopril  10 mg Oral Daily    spironolactone  25 mg Oral Daily    tamsulosin  0.4 mg Oral Nightly    insulin glargine  0.25 Units/kg SubCUTAneous Nightly    insulin lispro  0-8 Units SubCUTAneous TID WC    insulin lispro  0-4 Units SubCUTAneous Nightly    pantoprazole  40 mg IntraVENous Daily    sodium chloride flush  10 mL IntraVENous 2 times per day     PRN Meds: heparin (porcine), heparin (porcine), LORazepam, glucose, dextrose bolus **OR** dextrose bolus, glucagon (rDNA), dextrose, sodium chloride flush, sodium chloride, potassium chloride **OR** potassium alternative oral replacement **OR** potassium chloride, magnesium sulfate, promethazine **OR** ondansetron, senna, acetaminophen **OR** acetaminophen      Intake/Output Summary (Last 24 hours) at 7/24/2022 0834  Last data filed at 7/24/2022 0516  Gross per 24 hour   Intake 2080 ml   Output 1025 ml   Net 1055 ml       Physical Exam Performed:    /77   Pulse 87   Temp 97.4 °F (36.3 °C) (Oral)   Resp 24   Ht 6' (1.829 m)   Wt 200 lb (90.7 kg)   SpO2 94%   BMI 27.12 kg/m²     General appearance: No apparent distress, appears stated age and cooperative. HEENT: Pupils equal, round, and reactive to light. Conjunctivae/corneas clear. Neck: Supple, with full range of motion. No jugular venous distention. Trachea midline. Respiratory:  Normal respiratory effort.  Clear to auscultation, bilaterally without Rales/Wheezes/Rhonchi. Cardiovascular: Regular rate and rhythm with normal S1/S2 without murmurs, rubs or gallops. Abdomen: Soft, non-tender, non-distended with normal bowel sounds. Musculoskeletal: No clubbing, cyanosis or edema bilaterally. Full range of motion without deformity. Skin: Skin color, texture, turgor normal.  No rashes or lesions. Neurologic:  Neurovascularly intact without any focal sensory/motor deficits. Cranial nerves: II-XII intact, grossly non-focal.  Psychiatric: Alert and oriented, thought content appropriate, normal insight  Capillary Refill: Brisk,3 seconds, normal   Peripheral Pulses: +2 palpable, equal bilaterally       Labs:   Recent Labs     07/23/22  1151 07/24/22  0312   WBC 12.2* 10.9   HGB 14.5 12.9*   HCT 44.1 37.6*    156     Recent Labs     07/23/22  1151 07/24/22  0312    135*   K 4.5 4.3   CL 98* 103   CO2 20* 20*   BUN 20 21*   CREATININE 1.0 0.9   CALCIUM 10.3 9.4     Recent Labs     07/23/22  1151 07/24/22  0312   * 65*   * 89*   BILITOT 1.5* 1.1*   ALKPHOS 123 98     No results for input(s): INR in the last 72 hours. Recent Labs     07/23/22 1151   TROPONINI 0.19*       Urinalysis:      Lab Results   Component Value Date/Time    NITRU Negative 08/29/2021 09:40 PM    BLOODU Negative 08/29/2021 09:40 PM    SPECGRAV 1.010 08/29/2021 09:40 PM    GLUCOSEU >=1000 08/29/2021 09:40 PM       Radiology:  CT FACIAL BONES WO CONTRAST   Final Result   No acute facial bone trauma. CT CERVICAL SPINE WO CONTRAST   Final Result      No evidence of fracture with multilevel degenerative changes as described. CT HEAD WO CONTRAST   Final Result   No acute intracranial abnormality. CT CHEST PULMONARY EMBOLISM W CONTRAST   Final Result   1. Acute bilateral pulmonary embolism. Additional evidence of right   ventricular strain. 2. The lungs are clear with mild granulomatous change incidentally noted on   the right.    Findings were discussed with Devendra Gan at 2:35 pm on 7/23/2022. XR CHEST PORTABLE   Preliminary Result   No acute cardiopulmonary findings.          VL Extremity Venous Bilateral    (Results Pending)           Assessment/Plan:    Active Hospital Problems    Diagnosis     Uncontrolled type 2 diabetes mellitus with hyperglycemia (HCC) [E11.65]      Priority: Medium    Acute saddle pulmonary embolism with acute cor pulmonale (HCC) [I26.02]      Priority: Medium    Coronary artery disease involving native coronary artery of native heart with unstable angina pectoris (HCC) [I25.110]     Ischemic cardiomyopathy [I25.5]     HTN (hypertension) [I10]     Hyperlipidemia [E78.5]          Acute bilateral PE with RV strain  -Patient admitted to PCU for continuous telemetry and pulse oximetry monitoring  -Encourage incentive spirometry; PRN supplemental O2 scheduled  -Patient initiated on high-dose weight-based heparin GTT overnight with serial aPTT/ Anti-FXa levels to ensure therapeutic anticoagulation  -ECHO scheduled to further assess RV heart strain suggested on CT scan  -BLE venous duplex scheduled to identify initial source of clot burden   -Consult placed to CT surgery by ER to determine if patient is a candidate for embolectomy or EKOS tPA     CAD s/p PCI with LUI  -Patient admitted to telemetry floor for continuous monitoring during stay  -EKG obtained in ED reviewed personally and notable for new RBBB without ischemia  -Continue home medication dosage of Coreg, lisinopril, Aldactone, and EC ASA; patient reports he discontinued his statin sometime ago as he felt it was unnecessary     Uncontrolled type II DM  -Patient endorses frequent POC glucose ranging 200s-300s despite multiple oral hypoglycemic agents; A1c pending  -All oral hypoglycemics placed on temporary hold during hospital stay  -Patient initiated on weight-based Lantus nightly  -Medium dose Humalog SSI to be administered before meals and at bedtime based on POC glucose level  -Carb restriction added to diet     DVT prophylaxis-high-dose weight-based heparin drip to treat PE  Diet: ADULT DIET; Regular; 4 carb choices (60 gm/meal);  Low Fat/Low Chol/High Fiber/2 gm Na; 1800 ml  Code Status: Full Code    PT/OT Eval Status: not ordered    Dispo - pending workup    Naldo Graves MD

## 2022-07-25 ENCOUNTER — APPOINTMENT (OUTPATIENT)
Dept: VASCULAR LAB | Age: 67
DRG: 167 | End: 2022-07-25
Payer: OTHER GOVERNMENT

## 2022-07-25 ENCOUNTER — APPOINTMENT (OUTPATIENT)
Dept: CARDIAC CATH/INVASIVE PROCEDURES | Age: 67
DRG: 167 | End: 2022-07-25
Payer: OTHER GOVERNMENT

## 2022-07-25 PROBLEM — I51.7 ENLARGED RV (RIGHT VENTRICLE): Status: ACTIVE | Noted: 2022-07-25

## 2022-07-25 PROBLEM — I51.89 MODERATE RIGHT VENTRICULAR SYSTOLIC DYSFUNCTION: Status: ACTIVE | Noted: 2022-07-25

## 2022-07-25 PROBLEM — I27.20 PULMONARY HTN (HCC): Status: ACTIVE | Noted: 2022-07-25

## 2022-07-25 PROBLEM — E87.20 NORMAL ANION GAP METABOLIC ACIDOSIS: Status: ACTIVE | Noted: 2022-07-25

## 2022-07-25 PROBLEM — I82.411 ACUTE DEEP VEIN THROMBOSIS (DVT) OF FEMORAL VEIN OF RIGHT LOWER EXTREMITY (HCC): Status: ACTIVE | Noted: 2022-07-25

## 2022-07-25 LAB
ANION GAP SERPL CALCULATED.3IONS-SCNC: 13 MMOL/L (ref 3–16)
ANTI-XA UNFRAC HEPARIN: 0.71 IU/ML (ref 0.3–0.7)
ANTI-XA UNFRAC HEPARIN: 0.76 IU/ML (ref 0.3–0.7)
APTT: 35.5 SEC (ref 23–34.3)
APTT: 40.9 SEC (ref 23–34.3)
BUN BLDV-MCNC: 21 MG/DL (ref 7–20)
CALCIUM SERPL-MCNC: 9.2 MG/DL (ref 8.3–10.6)
CHLORIDE BLD-SCNC: 105 MMOL/L (ref 99–110)
CO2: 19 MMOL/L (ref 21–32)
CREAT SERPL-MCNC: 0.8 MG/DL (ref 0.8–1.3)
FIBRINOGEN: 494 MG/DL (ref 207–509)
FIBRINOGEN: 549 MG/DL (ref 207–509)
GFR AFRICAN AMERICAN: >60
GFR NON-AFRICAN AMERICAN: >60
GLUCOSE BLD-MCNC: 112 MG/DL (ref 70–99)
GLUCOSE BLD-MCNC: 116 MG/DL (ref 70–99)
GLUCOSE BLD-MCNC: 86 MG/DL (ref 70–99)
GLUCOSE BLD-MCNC: 96 MG/DL (ref 70–99)
HCT VFR BLD CALC: 37.5 % (ref 40.5–52.5)
HCT VFR BLD CALC: 39.6 % (ref 40.5–52.5)
HEMOGLOBIN: 12.5 G/DL (ref 13.5–17.5)
HEMOGLOBIN: 13.4 G/DL (ref 13.5–17.5)
LV EF: 53 %
LVEF MODALITY: NORMAL
MCH RBC QN AUTO: 29.7 PG (ref 26–34)
MCH RBC QN AUTO: 29.7 PG (ref 26–34)
MCHC RBC AUTO-ENTMCNC: 33.2 G/DL (ref 31–36)
MCHC RBC AUTO-ENTMCNC: 33.9 G/DL (ref 31–36)
MCV RBC AUTO: 87.7 FL (ref 80–100)
MCV RBC AUTO: 89.6 FL (ref 80–100)
PDW BLD-RTO: 14.2 % (ref 12.4–15.4)
PDW BLD-RTO: 14.4 % (ref 12.4–15.4)
PERFORMED ON: ABNORMAL
PERFORMED ON: NORMAL
PERFORMED ON: NORMAL
PLATELET # BLD: 149 K/UL (ref 135–450)
PLATELET # BLD: 158 K/UL (ref 135–450)
PMV BLD AUTO: 6 FL (ref 5–10.5)
PMV BLD AUTO: 6.2 FL (ref 5–10.5)
POTASSIUM SERPL-SCNC: 4.1 MMOL/L (ref 3.5–5.1)
PROSTATE SPECIFIC ANTIGEN: 2.26 NG/ML (ref 0–4)
RBC # BLD: 4.19 M/UL (ref 4.2–5.9)
RBC # BLD: 4.51 M/UL (ref 4.2–5.9)
REASON FOR REJECTION: NORMAL
REJECTED TEST: NORMAL
SODIUM BLD-SCNC: 137 MMOL/L (ref 136–145)
WBC # BLD: 8 K/UL (ref 4–11)
WBC # BLD: 9.5 K/UL (ref 4–11)

## 2022-07-25 PROCEDURE — 94761 N-INVAS EAR/PLS OXIMETRY MLT: CPT

## 2022-07-25 PROCEDURE — C1751 CATH, INF, PER/CENT/MIDLINE: HCPCS

## 2022-07-25 PROCEDURE — 75743 ARTERY X-RAYS LUNGS: CPT

## 2022-07-25 PROCEDURE — 85730 THROMBOPLASTIN TIME PARTIAL: CPT

## 2022-07-25 PROCEDURE — C1887 CATHETER, GUIDING: HCPCS

## 2022-07-25 PROCEDURE — 2500000003 HC RX 250 WO HCPCS: Performed by: INTERNAL MEDICINE

## 2022-07-25 PROCEDURE — 85027 COMPLETE CBC AUTOMATED: CPT

## 2022-07-25 PROCEDURE — 36015 PLACE CATHETER IN ARTERY: CPT | Performed by: SURGERY

## 2022-07-25 PROCEDURE — 2580000003 HC RX 258: Performed by: SURGERY

## 2022-07-25 PROCEDURE — 36014 PLACE CATHETER IN ARTERY: CPT

## 2022-07-25 PROCEDURE — C1769 GUIDE WIRE: HCPCS

## 2022-07-25 PROCEDURE — 2700000000 HC OXYGEN THERAPY PER DAY

## 2022-07-25 PROCEDURE — 85384 FIBRINOGEN ACTIVITY: CPT

## 2022-07-25 PROCEDURE — 2000000000 HC ICU R&B

## 2022-07-25 PROCEDURE — B31S1ZZ FLUOROSCOPY OF RIGHT PULMONARY ARTERY USING LOW OSMOLAR CONTRAST: ICD-10-PCS | Performed by: SURGERY

## 2022-07-25 PROCEDURE — B31T1ZZ FLUOROSCOPY OF LEFT PULMONARY ARTERY USING LOW OSMOLAR CONTRAST: ICD-10-PCS | Performed by: SURGERY

## 2022-07-25 PROCEDURE — C1894 INTRO/SHEATH, NON-LASER: HCPCS

## 2022-07-25 PROCEDURE — 99233 SBSQ HOSP IP/OBS HIGH 50: CPT | Performed by: INTERNAL MEDICINE

## 2022-07-25 PROCEDURE — 6370000000 HC RX 637 (ALT 250 FOR IP): Performed by: SURGERY

## 2022-07-25 PROCEDURE — 6360000002 HC RX W HCPCS

## 2022-07-25 PROCEDURE — 6370000000 HC RX 637 (ALT 250 FOR IP): Performed by: INTERNAL MEDICINE

## 2022-07-25 PROCEDURE — 93970 EXTREMITY STUDY: CPT

## 2022-07-25 PROCEDURE — C8929 TTE W OR WO FOL WCON,DOPPLER: HCPCS

## 2022-07-25 PROCEDURE — 85520 HEPARIN ASSAY: CPT

## 2022-07-25 PROCEDURE — 36415 COLL VENOUS BLD VENIPUNCTURE: CPT

## 2022-07-25 PROCEDURE — 3E03317 INTRODUCTION OF OTHER THROMBOLYTIC INTO PERIPHERAL VEIN, PERCUTANEOUS APPROACH: ICD-10-PCS | Performed by: SURGERY

## 2022-07-25 PROCEDURE — 2709999900 HC NON-CHARGEABLE SUPPLY

## 2022-07-25 PROCEDURE — 37211 THROMBOLYTIC ART THERAPY: CPT | Performed by: SURGERY

## 2022-07-25 PROCEDURE — 37211 THROMBOLYTIC ART THERAPY: CPT

## 2022-07-25 PROCEDURE — 02FR3Z0 FRAGMENTATION OF LEFT PULMONARY ARTERY, PERCUTANEOUS APPROACH, ULTRASONIC: ICD-10-PCS | Performed by: SURGERY

## 2022-07-25 PROCEDURE — 02FQ3Z0 FRAGMENTATION OF RIGHT PULMONARY ARTERY, PERCUTANEOUS APPROACH, ULTRASONIC: ICD-10-PCS | Performed by: SURGERY

## 2022-07-25 PROCEDURE — 80048 BASIC METABOLIC PNL TOTAL CA: CPT

## 2022-07-25 PROCEDURE — 6360000002 HC RX W HCPCS: Performed by: SURGERY

## 2022-07-25 RX ORDER — FENTANYL CITRATE 50 UG/ML
INJECTION, SOLUTION INTRAMUSCULAR; INTRAVENOUS
Status: COMPLETED | OUTPATIENT
Start: 2022-07-25 | End: 2022-07-25

## 2022-07-25 RX ORDER — MIDAZOLAM HYDROCHLORIDE 1 MG/ML
INJECTION INTRAMUSCULAR; INTRAVENOUS
Status: COMPLETED | OUTPATIENT
Start: 2022-07-25 | End: 2022-07-25

## 2022-07-25 RX ORDER — SODIUM CHLORIDE 9 MG/ML
25 INJECTION, SOLUTION INTRAVENOUS PRN
Status: DISCONTINUED | OUTPATIENT
Start: 2022-07-25 | End: 2022-07-26 | Stop reason: HOSPADM

## 2022-07-25 RX ORDER — SODIUM CHLORIDE 0.9 % (FLUSH) 0.9 %
5-40 SYRINGE (ML) INJECTION EVERY 12 HOURS SCHEDULED
Status: DISCONTINUED | OUTPATIENT
Start: 2022-07-25 | End: 2022-07-26 | Stop reason: HOSPADM

## 2022-07-25 RX ORDER — HEPARIN SODIUM 10000 [USP'U]/100ML
250 INJECTION, SOLUTION INTRAVENOUS CONTINUOUS
Status: DISCONTINUED | OUTPATIENT
Start: 2022-07-25 | End: 2022-07-26

## 2022-07-25 RX ORDER — SODIUM CHLORIDE 9 MG/ML
INJECTION, SOLUTION INTRAVENOUS CONTINUOUS
Status: DISCONTINUED | OUTPATIENT
Start: 2022-07-25 | End: 2022-07-26

## 2022-07-25 RX ORDER — ONDANSETRON 4 MG/1
4 TABLET, ORALLY DISINTEGRATING ORAL EVERY 8 HOURS PRN
Status: DISCONTINUED | OUTPATIENT
Start: 2022-07-25 | End: 2022-07-26 | Stop reason: HOSPADM

## 2022-07-25 RX ORDER — SODIUM CHLORIDE 9 MG/ML
INJECTION, SOLUTION INTRAVENOUS CONTINUOUS PRN
Status: DISCONTINUED | OUTPATIENT
Start: 2022-07-25 | End: 2022-07-26

## 2022-07-25 RX ORDER — SODIUM CHLORIDE 0.9 % (FLUSH) 0.9 %
5-40 SYRINGE (ML) INJECTION PRN
Status: DISCONTINUED | OUTPATIENT
Start: 2022-07-25 | End: 2022-07-26 | Stop reason: HOSPADM

## 2022-07-25 RX ORDER — ONDANSETRON 2 MG/ML
4 INJECTION INTRAMUSCULAR; INTRAVENOUS EVERY 6 HOURS PRN
Status: DISCONTINUED | OUTPATIENT
Start: 2022-07-25 | End: 2022-07-26 | Stop reason: HOSPADM

## 2022-07-25 RX ADMIN — ATORVASTATIN CALCIUM 80 MG: 40 TABLET, FILM COATED ORAL at 20:40

## 2022-07-25 RX ADMIN — SODIUM CHLORIDE: 9 INJECTION, SOLUTION INTRAVENOUS at 16:56

## 2022-07-25 RX ADMIN — Medication 10 ML: at 20:41

## 2022-07-25 RX ADMIN — HEPARIN SODIUM 250 UNITS/HR: 10000 INJECTION, SOLUTION INTRAVENOUS at 09:12

## 2022-07-25 RX ADMIN — SODIUM BICARBONATE 50 MEQ: 84 INJECTION INTRAVENOUS at 20:40

## 2022-07-25 RX ADMIN — SODIUM CHLORIDE: 9 INJECTION, SOLUTION INTRAVENOUS at 09:11

## 2022-07-25 RX ADMIN — ALTEPLASE 0.5 MG/HR: 2.2 INJECTION, POWDER, LYOPHILIZED, FOR SOLUTION INTRAVENOUS at 09:11

## 2022-07-25 RX ADMIN — MUPIROCIN: 20 OINTMENT TOPICAL at 20:46

## 2022-07-25 RX ADMIN — INSULIN GLARGINE 23 UNITS: 100 INJECTION, SOLUTION SUBCUTANEOUS at 20:31

## 2022-07-25 RX ADMIN — MIDAZOLAM HYDROCHLORIDE 1.5 MG: 1 INJECTION INTRAMUSCULAR; INTRAVENOUS at 08:03

## 2022-07-25 RX ADMIN — ALTEPLASE 0.5 MG/HR: 2.2 INJECTION, POWDER, LYOPHILIZED, FOR SOLUTION INTRAVENOUS at 09:08

## 2022-07-25 RX ADMIN — FENTANYL CITRATE 75 MCG: 50 INJECTION, SOLUTION INTRAMUSCULAR; INTRAVENOUS at 08:02

## 2022-07-25 RX ADMIN — ACETAMINOPHEN 650 MG: 325 TABLET ORAL at 13:20

## 2022-07-25 RX ADMIN — HEPARIN SODIUM 250 UNITS/HR: 10000 INJECTION, SOLUTION INTRAVENOUS at 09:10

## 2022-07-25 RX ADMIN — TAMSULOSIN HYDROCHLORIDE 0.4 MG: 0.4 CAPSULE ORAL at 20:40

## 2022-07-25 RX ADMIN — GABAPENTIN 600 MG: 300 CAPSULE ORAL at 20:40

## 2022-07-25 ASSESSMENT — PAIN DESCRIPTION - LOCATION
LOCATION: BACK
LOCATION: BACK

## 2022-07-25 ASSESSMENT — PAIN SCALES - GENERAL
PAINLEVEL_OUTOF10: 5
PAINLEVEL_OUTOF10: 7
PAINLEVEL_OUTOF10: 0
PAINLEVEL_OUTOF10: 4

## 2022-07-25 ASSESSMENT — PAIN DESCRIPTION - ORIENTATION
ORIENTATION: LOWER
ORIENTATION: LOWER

## 2022-07-25 NOTE — PROGRESS NOTES
Shift: [id number]    Admitting diagnosis: saddle PE w/+ heart strain    Presentation to hospital: EMS after syncopal episode @ home accompanied with SOB (fell face down on hardwood floors)    Surgery: yes - 7/25 EKOS     Nursing assessment at handoff  stable    Emergency Contact/POA:wife Annie  Family updated: yes - 7/25-present    Most recent vitals: /72   Pulse 77   Temp (!) 96.3 °F (35.7 °C) (Oral)   Resp 17   Ht 6' (1.829 m)   Wt 200 lb (90.7 kg)   SpO2 96%   BMI 27.12 kg/m²      Rhythm: Normal Sinus Rhythm 70's     NC/HFNC- 2 lpm  Respiratory support: - No ventilator support    Vent days: Day NA    Increased O2 requirements: no    Admission weight Weight: 200 lb (90.7 kg)  Today's weight   Wt Readings from Last 1 Encounters:   07/23/22 200 lb (90.7 kg)         UOP >30ml/hr: no (voids)    Burroughs need assessed each shift: NA    Restraints: NA     Lines/Drains  LDA Insertion Date Discontinued Date Dressing Changes   PIV  7/24 x2     TLC       Arterial       Burroughs       Vas Cath      ETT       Surgical drains        Night Shift Hospitalist Interventions    Problem(Brief) Date Time Intervention Physician contacted                                               Drip rates at handoff:    alteplase (ACTIVASE) 10 mg in 0.9% sodium chloride infusion 100 mL 0.5 mg/hr (07/25/22 0911)    And    alteplase (ACTIVASE) 10 mg in 0.9% sodium chloride infusion 100 mL 0.5 mg/hr (07/25/22 0908)    heparin (PORCINE) Infusion 250 Units/hr (07/25/22 0912)    And    heparin (PORCINE) Infusion 250 Units/hr (07/25/22 0910)    sodium chloride 35 mL/hr at 07/25/22 1012    And    sodium chloride 35 mL/hr at 07/25/22 1012    sodium chloride      sodium chloride      sodium chloride 100 mL/hr at 07/24/22 1449    dextrose      sodium chloride         Hospital Course Daily Updates:  Admit Day# 2  To ICU room 221 after EKOS started via fem venous sheaths x2 rt groin; venous duplex done        Lab Data:   CBC:   Recent Labs 07/24/22  0312 07/25/22  1230   WBC 10.9 8.0   HGB 12.9* 12.5*   HCT 37.6* 37.5*   MCV 87.3 89.6    149     BMP:    Recent Labs     07/24/22  0312 07/25/22  1230   * 137   K 4.3 4.1   CO2 20* 19*   BUN 21* 21*   CREATININE 0.9 0.8     LIVR:   Recent Labs     07/23/22  1151 07/24/22  0312   * 65*   * 89*     PT/INR:   Recent Labs     07/23/22  1151 07/24/22  0312 07/24/22  1304   PROT 7.4 6.3*  --    INR  --   --  1.10     APTT:   Recent Labs     07/23/22  1450 07/25/22  1346   APTT 29.9 40.9*     ABG: No results for input(s): PHART, NZQ0ZAP, PO2ART in the last 72 hours.   Consults (if GI or Nephrology- which group?)-  NA

## 2022-07-25 NOTE — CARE COORDINATION
Spoke with patient and wife at bedside. Patient lives at home with his wife. He is independent at home. Denies any DME or services at home. He has insurance and a PCP. Likely no needs. Please notify CM should any needs arise.

## 2022-07-25 NOTE — PROGRESS NOTES
Report received at bedside from [facility], lines checked & medications checked, to right groin site. No bleeding, bruising or oozing noted from site, soft to touch. Pedal pulses noted, moderated, no numbness or tingling to legs. Teaching done concerning resting & post care procedure. On 2L 02 via N/C. Wife & daughter at bedside. Wife stated patient had fall Saturday morning  after getting up to bathroom, had leg cramp. Dark purple bruising noted to left side of chin. Upper jaw teeth loose & sore per patient. Patient hit mouth & chin on hardwood floors when fell. on Saturday. Hospitalist into see patient. Hx cough, sinsus issues and has been seen at [place] for ongoing symptoms. Monitoring. Report given to [facility].  Livia Pierce RN

## 2022-07-25 NOTE — PROGRESS NOTES
Removed 2 sutures and simultaneously removed both rt fem venous sheaths. Pressure held x 20 minutes. Folded 4x4 covered with a tegaderm placed over site. Pt to remain flat in bed until 2030.

## 2022-07-25 NOTE — PROGRESS NOTES
Vascular Surgery Progress Note      No new issues overnight. Reports he is breathing better. Sat mid 90s on 2L NC  Appears comfortable in bed. All questions answered. Will proceed with EKOS.

## 2022-07-25 NOTE — PROGRESS NOTES
Physical Exam Performed:    /71   Pulse 84   Temp 97.7 °F (36.5 °C) (Oral)   Resp 24   Ht 6' (1.829 m)   Wt 200 lb (90.7 kg)   SpO2 94%   BMI 27.12 kg/m²     General appearance: No apparent distress, appears stated age and cooperative. HEENT: . Conjunctivae/corneas clear. Upper lip swollen , tender ,   Neck: Supple, with full range of motion. No jugular venous distention. Trachea midline. Respiratory:  Normal respiratory effort. Clear to auscultation, bilaterally without Rales/Wheezes/Rhonchi. On o2   Cardiovascular: Regular rate and rhythm with normal S1/S2 without murmurs, rubs or gallops. Abdomen: Soft, non-tender, non-distended with normal bowel sounds. Musculoskeletal: No clubbing, cyanosis or edema bilaterally. Full range of motion without deformity. Skin: Skin color, texture, turgor normal.   Bruise over the chin . Neurologic:  Neurovascularly intact without any focal sensory/motor deficits. Cranial Psychiatric: Alert and oriented, thought content appropriate, normal insight  Capillary Refill: Brisk,3 seconds, normal   Peripheral Pulses: +2 palpable, equal bilaterally       Labs:   Recent Labs     07/23/22 1151 07/24/22 0312   WBC 12.2* 10.9   HGB 14.5 12.9*   HCT 44.1 37.6*    156       Recent Labs     07/23/22 1151 07/24/22 0312    135*   K 4.5 4.3   CL 98* 103   CO2 20* 20*   BUN 20 21*   CREATININE 1.0 0.9   CALCIUM 10.3 9.4       Recent Labs     07/23/22  1151 07/24/22 0312   * 65*   * 89*   BILITOT 1.5* 1.1*   ALKPHOS 123 98       Recent Labs     07/24/22  1304   INR 1.10     Recent Labs     07/23/22 1151   TROPONINI 0.19*         Urinalysis:      Lab Results   Component Value Date/Time    NITRU Negative 08/29/2021 09:40 PM    BLOODU Negative 08/29/2021 09:40 PM    SPECGRAV 1.010 08/29/2021 09:40 PM    GLUCOSEU >=1000 08/29/2021 09:40 PM       Radiology:  CT FACIAL BONES WO CONTRAST   Final Result   No acute facial bone trauma.          CT CERVICAL SPINE WO CONTRAST   Final Result      No evidence of fracture with multilevel degenerative changes as described. CT HEAD WO CONTRAST   Final Result   No acute intracranial abnormality. CT CHEST PULMONARY EMBOLISM W CONTRAST   Final Result   1. Acute bilateral pulmonary embolism. Additional evidence of right   ventricular strain. 2. The lungs are clear with mild granulomatous change incidentally noted on   the right. Findings were discussed with Paradise Mcfarlane at 2:35 pm on 7/23/2022. XR CHEST PORTABLE   Preliminary Result   No acute cardiopulmonary findings.          VL Extremity Venous Bilateral    (Results Pending)           Assessment/Plan:    Active Hospital Problems    Diagnosis     Uncontrolled type 2 diabetes mellitus with hyperglycemia ([facility]) [E11.65]      Priority: Medium    Acute saddle pulmonary embolism with acute cor pulmonale ([facility]) [I26.02]      Priority: Medium    Coronary artery disease involving native coronary artery of native heart with unstable angina pectoris (HCC) [I25.110]     Ischemic cardiomyopathy [I25.5]     HTN (hypertension) [I10]     Hyperlipidemia [E78.5]          Acute bilateral PE with RV strain  -IV heparin -pharmacy to dose   -ECHO scheduled to further assess RV heart strain suggested on CT scan  -BLE venous duplex scheduled to identify initial source of clot burden   -vascular sx input appreciated   S/p Bilateral pulmonary arteriogram; placement of lysis catheters and initiation of thrombolysis- 7/25   Intensivist following        CAD s/p PCI with LUI  - home medication dosage of Coreg, lisinopril, Aldactone, and EC ASA; patient reports he discontinued his statin sometime ago as he felt it was unnecessary  Given elevated trop , cardio consulted   Possibly 2/2 PE  Cardio input appreciated , and signed off      Uncontrolled type II DM  -Patient endorses frequent POC glucose ranging 200s-300s despite multiple oral hypoglycemic agents; A1c pending  -All oral hypoglycemics placed on temporary hold during hospital stay  -Patient initiated on weight-based Lantus nightly  -Medium dose Humalog SSI to be administered before meals and at bedtime based on POC glucose level  -Carb restriction added to diet    Elevated LFT- trending down        DVT prophylaxis-high-dose weight-based heparin drip to treat PE  Diet: Diet NPO Exceptions are: Sips of Water with Meds, Ice Chips  Code Status: Full Code    PT/OT Eval Status: not ordered    Dispo - remains icu     Kayla Crowell MD

## 2022-07-25 NOTE — PROGRESS NOTES
INPATIENT PULMONARY CRITICAL CARE PROGRESS NOTE      Reason for visit    Acute B/l pulmonary embolism      SUBJECTIVE: Patient underwent underwent EKOS without any perioperative complications, patient does not have any significant cough or expectoration of concern at this time, no hemoptysis, patient does not have any significant chest pain or palpitations, patient was having significant low back pain, patient was having difficulty lying flat at this time, patient has slightly elevated diastolic blood pressure, patient does not have any significant abdominal discomfort, patient does not have any significant leg pain, patient did not have any cramping of the legs prior to come to the hospital, patient did not have any significant known ambulation, no personal family history of blood clots, patient glycemic control was acceptable, patient was on 2 L of nasal cannula oxygen with saturation 96%, patient has had adequate urine output as documented with cumulative fluid balance of +2.6 L, patient was alert and oriented, no other pertinent review of system of concern           Physical Exam:  Blood pressure (!) 129/90, pulse 80, temperature (!) 95.3 °F (35.2 °C), temperature source Axillary, resp. rate 16, height 6' (1.829 m), weight 200 lb (90.7 kg), SpO2 95 %.'   Constitutional:  No acute distress. HENT:  Oropharynx is clear and moist. No thyromegaly. Large ecchymosis in the submental area along with that patient also had some crusting of the lips  Eyes:  Conjunctivae are normal. Pupils equal, round, and reactive to light. No scleral icterus. Neck: . No tracheal deviation present. No obvious thyroid mass. Cardiovascular: Normal rate, regular rhythm, normal heart sounds. No right ventricular heave. No lower extremity edema. Pulmonary/Chest: No wheezes. No rales. Chest wall is not dull to percussion. No accessory muscle usage or stridor. Abdominal: Soft. Bowel sounds present. No distension or hernia.  No tenderness. Musculoskeletal: No cyanosis. No clubbing. No obvious joint deformity. Lymphadenopathy: No cervical or supraclavicular adenopathy. Skin: Skin is warm and dry. No rash or nodules on the exposed extremities. Psychiatric: Normal mood and affect. Behavior is normal.  No anxiety. Neurologic: Alert, awake and oriented. PERRL. Speech fluent        Results:  CBC:   Recent Labs     07/24/22  0312 07/25/22  1230 07/25/22  1834   WBC 10.9 8.0 9.5   HGB 12.9* 12.5* 13.4*   HCT 37.6* 37.5* 39.6*   MCV 87.3 89.6 87.7    149 158     BMP:   Recent Labs     07/23/22  1151 07/24/22  0312 07/25/22  1230    135* 137   K 4.5 4.3 4.1   CL 98* 103 105   CO2 20* 20* 19*   BUN 20 21* 21*   CREATININE 1.0 0.9 0.8     LIVER PROFILE:   Recent Labs     07/23/22  1151 07/24/22  0312   * 65*   * 89*   BILITOT 1.5* 1.1*   ALKPHOS 123 98     PT/INR:   Recent Labs     07/24/22  1304   PROTIME 14.0   INR 1.10     APTT:   Recent Labs     07/23/22  1450 07/25/22  1346 07/25/22  1834   APTT 29.9 40.9* 35.5*         Imaging:  I have reviewed radiology images personally. VL Extremity Venous Bilateral         CT FACIAL BONES WO CONTRAST   Final Result   No acute facial bone trauma. CT CERVICAL SPINE WO CONTRAST   Final Result      No evidence of fracture with multilevel degenerative changes as described. CT HEAD WO CONTRAST   Final Result   No acute intracranial abnormality. CT CHEST PULMONARY EMBOLISM W CONTRAST   Final Result   1. Acute bilateral pulmonary embolism. Additional evidence of right   ventricular strain. 2. The lungs are clear with mild granulomatous change incidentally noted on   the right. Findings were discussed with [facility] at 2:35 pm on 7/23/2022. XR CHEST PORTABLE   Preliminary Result   No acute cardiopulmonary findings.            Echo Complete    Result Date: 7/25/2022  Transthoracic Echocardiography Report (TTE)  Demographics   Patient Name Na Mean   Date of Study      07/25/2022          Gender              Male   Patient Number     [id number]          Date of Birth       1955   Visit Number       [id number]           Age                 69 year(s)   Accession Number   [id number]          Room Number         [facility]   Corporate ID       [id number]            Sonographer         Essence Barberch, RT   Ordering Physician Ailyn Bennett., MD              Physician           Jeyson Carrasco MD  Procedure Type of Study   TTE procedure:ECHOCARDIOGRAM COMPLETE 2D W DOPPLER W COLOR. Procedure Date Date: 07/25/2022 Start: 06:37 AM Study Location: [address] [place] [address] - Echo Lab Technical Quality: Limited visualization Indications:Pulmonary embolus. Patient Status: STAT Contrast Medium: Definity. Height: 72 inches Weight: 200 pounds BSA: 2.13 m2 BMI: 27.13 kg/m2 BP: 159/84 mmHg  Conclusions   Summary  The left ventricular systolic function is mildly reduced with an ejection  fraction of 50- 55 %. Septal bounce due to right ventricular volume overload. Normal left ventricular size with mild concentric left ventricular  hypertrophy. Grade I diastolic dysfunction with normal filling pressure. Compared to previous study left ventricle systolic function appears same as  echo on 10-. The right ventricle is moderate to severely enlarged. Right ventricular systolic function is moderate to severely reduced . Mild tricuspid and pulmonic regurgitation. Systolic pulmonic artery pressure (SPAP) is estimated at 52 mmHg consistent  with moderate pulmonary hypertension (Right atrial pressure of 8 mmHg). The right atrium is moderately dilated.    Signature   ------------------------------------------------------------------  Electronically signed by Jeyson Carrasco MD (Interpreting  physician) on 07/25/2022 at 08:56 AM  ------------------------------------------------------------------ Findings   Left Ventricle  The left ventricular systolic function is mildly reduced with an ejection  fraction of 50- 55 %. Septal bounce due to right ventricular volume overload. Normal left ventricular size with mild concentric left ventricular  hypertrophy. Grade I diastolic dysfunction with normal filling pressure. Compared to previous study left ventricle systolic function appears same as  echo on 10-. Mitral Valve  Mitral valve is structurally normal.  No evidence of mitral regurgitation. Left Atrium  The left atrium is normal in size. Aortic Valve  Aortic valve appears sclerotic but opens adequately. No evidence of aortic valve regurgitation. Aorta  The aortic root is normal in size. Right Ventricle  The right ventricle is moderate to severely enlarged. Right ventricular systolic function is moderate to severely reduced . TAPSE is measured at 13 mm. S' prime velocity is measured at 6 cm/s. Tricuspid Valve  Tricuspid valve is structurally normal.  Mild tricuspid regurgitation. Systolic pulmonic artery pressure (SPAP) is estimated at 52 mmHg consistent  with moderate pulmonary hypertension (Right atrial pressure of 8 mmHg). Right Atrium  The right atrium is moderately dilated in size at 21 cm2. Pulmonic Valve  The pulmonic valve is not well visualized. Mild pulmonic regurgitation present. Pericardial Effusion  There is a trivial localized near right ventricle pericardial effusion  noted. No tamponade physiology. Pleural Effusion  No pleural effusion. Miscellaneous  IVC size is normal (<2.1 cm) but collapses < 50% with respiration consistent  with elevated RA pressure (8 mmHg).   M-Mode/2D Measurements (cm)   LV Diastolic Dimension: 8.16 cm LV Systolic Dimension: 2.71 cm  LV Septum Diastolic: 8.54 cm  LV PW Diastolic: 1.52 cm        AO Root Dimension: 2.9 cm                                  AV Cusp Separation: 1.9 cm                                  LA Dimension: 2.1 cm LA Area: 12 cm2                                  LA volume/Index: 27 ml /13 ml/m2  Doppler Measurements   AV Peak Velocity: 109 cm/s    MV Peak E-Wave: 39.8 cm/s  AV Peak Gradient: 4.75 mmHg   MV Peak A-Wave: 60.4 cm/s                                MV E/A Ratio: 0.66   TR Velocity:335 cm/s  TR Gradient:44.89 mmHg  Estimated RAP:8 mmHg  Estimated RVSP: 52 mmHg  E' Septal Velocity: 4.13 cm/s  E' Lateral Velocity: 9.9 cm/s  E/E' ratio: 6.8  PV Peak Velocity: 105 cm/s  PV Peak Gradient: 4.41 mmHg   Aortic Valve   Peak Velocity: 109 cm/s  Peak Gradient: 4.75 mmHg   Cusp Separation: 1.9 cm  Aorta   Aortic Root: 2.9 cm      CT HEAD WO CONTRAST    Result Date: 7/23/2022  EXAMINATION: CT OF THE HEAD WITHOUT CONTRAST  7/23/2022 1:53 pm TECHNIQUE: CT of the head was performed without the administration of intravenous contrast. Automated exposure control, iterative reconstruction, and/or weight based adjustment of the mA/kV was utilized to reduce the radiation dose to as low as reasonably achievable. COMPARISON: None. HISTORY: ORDERING SYSTEM PROVIDED HISTORY: fall TECHNOLOGIST PROVIDED HISTORY: Reason for exam:->fall Has a \"code stroke\" or \"stroke alert\" been called? ->No Decision Support Exception - unselect if not a suspected or confirmed emergency medical condition->Emergency Medical Condition (MA) Reason for Exam: pt had a syncopal episode and fell,hit his face FINDINGS: BRAIN/VENTRICLES: The ventricles and sulci are prominent. Pattern is consistent with age-related atrophy. No extra-axial fluid collections, and no sign of recent intracranial hemorrhage. Decreased attenuation is noted within the periventricular white matter. Pattern is consistent with chronic small vessel ischemic change. No acute edema or mass effect. No mass lesions are detected. ORBITS: The visualized portion of the orbits demonstrate no acute abnormality.  SINUSES: Mild mucosal thickening is observed in the visualized sphenoid sinuses. Paranasal sinuses are otherwise clear. SOFT TISSUES/SKULL:  No acute abnormality of the visualized skull or soft tissues. No acute intracranial abnormality. CT FACIAL BONES WO CONTRAST    Result Date: 7/23/2022  EXAMINATION: CT OF THE FACE WITHOUT CONTRAST  7/23/2022 1:53 pm TECHNIQUE: CT of the face was performed without the administration of intravenous contrast. Multiplanar reformatted images are provided for review. Automated exposure control, iterative reconstruction, and/or weight based adjustment of the mA/kV was utilized to reduce the radiation dose to as low as reasonably achievable. COMPARISON: None HISTORY: ORDERING SYSTEM PROVIDED HISTORY: syncope TECHNOLOGIST PROVIDED HISTORY: Reason for exam:->syncope Decision Support Exception - unselect if not a suspected or confirmed emergency medical condition->Emergency Medical Condition (MA) Reason for Exam: pt fell today and landed on his face,one of his teeth are loose,lac to lips FINDINGS: FACIAL BONES: The frontal sinuses, orbital walls, maxilla, pterygoid plates, zygomatic arches, hard palate, nasal bones and mandible are intact. The temporomandibular joints are aligned. ORBITAL CONTENTS: The globes appear intact. The extraocular muscles, optic nerve sheath complexes and lacrimal glands appear unremarkable. No retrobulbar hematoma or mass is seen. SINUSES: Mild mucosal thickening in the sphenoid sinuses. There is also some mucosal changes in the posterior ethmoid air cells. Paranasal sinuses are otherwise clear. SOFT TISSUES: No soft tissue contusion is appreciated of the face. No acute facial bone trauma.      C  VL Extremity Venous Bilateral    Result Date: 7/25/2022  Vascular Lower Extremities DVT Study Procedure -- PRELIMINARY SONOGRAPHER REPORT --   Demographics   Patient Name       [place]   Date of Study      07/25/2022         Gender              Male   Patient Number     [id number]         Date of Birth 1955   Visit Number       [id number]          Age                 69 year(s)   Accession Number   [id number]         Room Number         3884   Corporate ID       [id number]           Sonographer         Mary Vu,                                                            MELINA, RDCS,   Ordering Physician [facility]                     MD                 Physician           Readers  Procedure Type of Study:   Veins:Lower Extremities DVT Study, VASC EXTREMITY VENOUS DUPLEX BILATERAL. Tech Comments Right Limited exam due to recent Cath lab procedure. Multiple dressings and lines present at groin and proximal thigh area. There is acute deep vein thrombosis is seen involving the femoral, popliteal and gastrocnemius veins. Left Acute deep vein thrombosis involving the left popliteal vein. No other evidence of deep or superficial venous thrombosis seen involving the lower extremity. Assessment:  Principal Problem:    Acute saddle pulmonary embolism with acute cor pulmonale (HCC)  Active Problems:    Uncontrolled type 2 diabetes mellitus with hyperglycemia (HCC)    Acute deep vein thrombosis (DVT) of femoral vein of right lower extremity (HCC)    Pulmonary HTN (HCC)    Normal anion gap metabolic acidosis    Enlarged RV (right ventricle)    Moderate right ventricular systolic dysfunction    HTN (hypertension)    Hyperlipidemia    Coronary artery disease involving native coronary artery of native heart with unstable angina pectoris ([address]  Resolved Problems:    * No resolved hospital problems.  *          Plan:   Oxygen supplementation to keep saturation between 90 and 94%  Please titrate the oxygen as per the above parameters  Patient and family were shown the CT of the chest along with findings, differential diagnosis along with implications and options  Status post EKOS  Heparin infusion as per vascular surgery and the timings accordingly  Patient's venous Doppler results were also discussed with patient and family  Patient has unprovoked pulmonary embolism and DVT with no family history of hypercoagulable state  Patient does not have any central tumor in the chest of concern  Will send the PSA levels in the morning to make sure that they are not elevated which may warrant further evaluation  Patient to be given 1 dose of IV sodium bicarbonate for normal anion gap metabolic acidosis  No need for saline at this time  Patient will be allowed to eat at 8:30 PM as per nursing  Monitor for any bleeding  Insulins as per IM  Monitor for any hypoglycemia  Echocardiac results were reviewed and discussed with patient and family along with implications   Monitoring input output and BMP  Correct electrolytes on whenever necessary basis  PUD prophylaxis    Case discussed with ICU team      Electronically signed by:  Liam Bowser MD    7/25/2022    7:09 PM.

## 2022-07-25 NOTE — PROGRESS NOTES
Labels and Orders are correct and during bedside report all IV tubing has been physically traced from labeled bag, to channel, to IV site and verified by oncoming and off going nurse.        Report given [facility] RNElectronically signed by Chad Little RN on 7/25/22 at 9:32 AM EDT    Accepting RN{Esignature:[id number]}

## 2022-07-25 NOTE — CONSULTS
ONCOLOGY HEMATOLOGY CARE CONSULT NOTE      Requesting Physician:  Dr. Juve Donnelly:  PE        HISTORY OF PRESENT ILLNESS:      Mr. Emily Celaya  is a 69 y.o. male we are seeing in consultation for a PE. Saddle PE:  Event: Saddle PE diagnosed by CTPA on 7/23/2022. Provoking factor: None. Personal history: First event. Family history: No VTEs in the family. Modifiable risk factors: BMI 27. Quit smoking 4 years prior to admission. Age-appropriate cancer screening: Colonoscopy - gets this through the [place]. Thinks he had it 3-5 yrs ago. Couple polyps. Due again in 5 yrs. Coming due. PSA - Unsure. Hypercoag eval: None.   Treatment: UFH    Past Medical History:    Past Medical History:   Diagnosis Date    Acute saddle pulmonary embolism (HCC)     CAD (coronary artery disease)     Dr Minerva Lunsford, hx of stent    Diabetes mellitus ([facility] [address]     Hyperlipidemia     Hypertension     MI (myocardial infarction) [facility])      Past Surgical History:    Past Surgical History:   Procedure Laterality Date    APPENDECTOMY      CORONARY ANGIOPLASTY WITH STENT PLACEMENT      MUSCLE REPAIR      SHOULDER SURGERY         Current Medications:    Current Facility-Administered Medications   Medication Dose Route Frequency Provider Last Rate Last Admin    perflutren lipid microspheres (DEFINITY) injection 1.65 mg  1.5 mL IntraVENous ONCE PRN Matt Manley MD        0.9 % sodium chloride infusion   IntraVENous Continuous Jj Morrissey  mL/hr at 07/24/22 1449 New Bag at 07/24/22 1449    heparin (porcine) injection 6,600 Units  6,600 Units IntraVENous PRN Jannice Asa, PA        heparin (porcine) injection 3,300 Units  3,300 Units IntraVENous PRN Jannice Asa, PA        heparin 25,000 units in dextrose 5% 250 mL (premix) infusion  1,070 Units/hr IntraVENous Continuous Matt Manley MD 10.7 mL/hr at 07/24/22 1157 1,070 Units/hr at 07/24/22 1157    LORazepam (ATIVAN) tablet 0.5 mg  0.5 mg Oral Q6H PRN [facility] Rafael Chauhan MD        carvedilol (COREG) tablet 12.5 mg  12.5 mg Oral BID  Evelin Mejias MD   12.5 mg at 07/24/22 1801    aspirin EC tablet 81 mg  81 mg Oral Daily Evelin Mejias MD   81 mg at 07/24/22 0836    atorvastatin (LIPITOR) tablet 80 mg  80 mg Oral Nightly Evelin Mejias MD   80 mg at 07/24/22 2133    gabapentin (NEURONTIN) capsule 600 mg  600 mg Oral Nightly Evelin Mejias MD   600 mg at 07/24/22 2133    lisinopril (PRINIVIL;ZESTRIL) tablet 10 mg  10 mg Oral Daily Evelin Mejias MD   10 mg at 07/24/22 7726    spironolactone (ALDACTONE) tablet 25 mg  25 mg Oral Daily Evelin Mejias MD   25 mg at 07/24/22 0837    tamsulosin (FLOMAX) capsule 0.4 mg  0.4 mg Oral Nightly Evelin Mejias MD   0.4 mg at 07/24/22 2133    glucose chewable tablet 16 g  4 tablet Oral PRN Evelin Mejias MD        dextrose bolus 10% 125 mL  125 mL IntraVENous PRN Evelin Mejias MD        Or    dextrose bolus 10% 250 mL  250 mL IntraVENous PRN Evelin Mejias MD        glucagon (rDNA) injection 1 mg  1 mg SubCUTAneous PRN Evelin Mejias MD        dextrose 10 % infusion   IntraVENous Continuous PRN Evelin Mejias MD        insulin glargine (LANTUS) injection vial 23 Units  0.25 Units/kg SubCUTAneous Nightly Eveiln Mejias MD   23 Units at 07/24/22 2136    insulin lispro (HUMALOG) injection vial 0-8 Units  0-8 Units SubCUTAneous TID  Evelin Mejias MD   2 Units at 07/24/22 1341    insulin lispro (HUMALOG) injection vial 0-4 Units  0-4 Units SubCUTAneous Nightly Evelin Mejias MD        pantoprazole (PROTONIX) injection 40 mg  40 mg IntraVENous Daily Evelin Mejias MD   40 mg at 07/24/22 0836    sodium chloride flush 0.9 % injection 10 mL  10 mL IntraVENous 2 times per day Evelin Mejias MD   10 mL at 07/24/22 2133    sodium chloride flush 0.9 % injection 10 mL  10 mL IntraVENous PRN Evelin Mejias MD        0.9 % sodium chloride infusion   IntraVENous PRN Evelin Mejias MD        potassium chloride (KLOR-CON M) extended release tablet 40 mEq  40 mEq Oral PRN Richelle Gallo MD        Or    potassium bicarb-citric acid (EFFER-K) effervescent tablet 40 mEq  40 mEq Oral PRN Richelle Gallo MD        Or    potassium chloride 10 mEq/100 mL IVPB (Peripheral Line)  10 mEq IntraVENous PRN Richelle Gallo MD        magnesium sulfate 2000 mg in 50 mL IVPB premix  2,000 mg IntraVENous PRN Richelle Gallo MD        promethazine (PHENERGAN) tablet 12.5 mg  12.5 mg Oral Q6H PRN Richelle Gallo MD        Or    ondansetron [facility]) injection 4 mg  4 mg IntraVENous Q6H PRN Richelle Gallo MD        senna (SENOKOT) tablet 8.6 mg  1 tablet Oral Daily PRN Richelle Gallo MD        acetaminophen (TYLENOL) tablet 650 mg  650 mg Oral Q6H PRN Richelle Gallo MD   650 mg at 07/23/22 2121    Or    acetaminophen (TYLENOL) suppository 650 mg  650 mg Rectal Q6H PRN Richelle Gallo MD         Allergies: Allergies   Allergen Reactions    Ciprofloxacin        Social History:   Social History     Socioeconomic History    Marital status:      Spouse name: Not on file    Number of children: Not on file    Years of education: Not on file    Highest education level: Not on file   Occupational History    Not on file   Tobacco Use    Smoking status: Former    Smokeless tobacco: Never   Substance and Sexual Activity    Alcohol use: Yes     Comment: rarely    Drug use: No    Sexual activity: Not on file   Other Topics Concern    Not on file   Social History Narrative    Not on file     Social Determinants of Health     Financial Resource Strain: Not on file   Food Insecurity: Not on file   Transportation Needs: Not on file   Physical Activity: Not on file   Stress: Not on file   Social Connections: Not on file   Intimate Partner Violence: Not on file   Housing Stability: Not on file          Family History:     History reviewed. No pertinent family history.   REVIEW OF SYSTEMS:    Review of Systems    PHYSICAL EXAM:      Vitals:  BP 126/71   Pulse 84   Temp 97.7 °F (36.5 °C) (Oral)   Resp 24   Ht 6' (1.829 m)   Wt 200 lb (90.7 kg)   SpO2 94%   BMI 27.12 kg/m²     CONSTITUTIONAL:  awake, alert, cooperative, no apparent distress, and appears stated age NAD  EYES:  pupils equal, round and reactive to light, extra ocular muscles intact,sclera clear, conjunctiva normal  NECK:  Supple, symmetrical, trachea midline, no adenopathy, thyroid symmetric, not enlarged and no tenderness, skin normal  HEMATOLOGIC/LYMPHATICS:  no cervical lymphadenopathy, nosupraclavicular lymphadenopathy, no axillary lymphadenopathy and no inguinal lymphadenopathy  LUNGS:  No increased work of breathing, good air exchange, clear to auscultation bilaterally, no crackles or wheezing  CARDIOVASCULAR:  , regular rate and rhythm, normal S1 and S2, no S3 or S4, and no murmur noted  ABDOMEN:  No scars, normal bowel sounds, soft, non-distended, non-tender, no masses palpated, no hepatosplenomegally      MUSCULOSKELETAL:  There is no redness, warmth, or swelling of the joints. Full range of motion noted. Motor strength is 5 out of 5 all extremities bilaterally. NEUROLOGIC:  Awake, alert, oriented to name, place andtime. Cranial nerves II-XII are grossly intact. Motor is 5 out of 5 bilaterally.   SKIN:  no bruising or bleeding      DATA:    PT/INR:    Recent Labs     07/23/22  1151 07/24/22  0312 07/24/22  1304   PROT 7.4 6.3*  --    INR  --   --  1.10     PTT:  Recent Labs     07/23/22  1450   APTT 29.9     CMP:    Lab Results   Component Value Date/Time     07/24/2022 03:12 AM    K 4.3 07/24/2022 03:12 AM     07/24/2022 03:12 AM    CO2 20 07/24/2022 03:12 AM    BUN 21 07/24/2022 03:12 AM    PROT 6.3 07/24/2022 03:12 AM     Magnesium:  No results found for: MG  Phosphorus:  No components found for: PO4  Calcium:  No results found for: CA  CBC:    Lab Results   Component Value Date/Time    WBC 10.9 07/24/2022 03:12 AM    RBC 4.31 07/24/2022 03:12 AM    HGB 12.9 07/24/2022 03:12 AM    HCT 37.6 07/24/2022 03:12 AM    MCV 87.3 07/24/2022 03:12 AM    RDW 14.4 07/24/2022 03:12 AM     07/24/2022 03:12 AM     DIFF:    Lab Results   Component Value Date/Time    MCV 87.3 07/24/2022 03:12 AM    RDW 14.4 07/24/2022 03:12 AM      LDH:  @labcrnt(LDH)@  Uric Acid:  @labcrnt(URIC)@    Radiology Review:  CT CHEST PULMONARY EMBOLISM W CONTRAST (7/23/2022): Impression   1. Acute bilateral pulmonary embolism. Additional evidence of right   ventricular strain. 2. The lungs are clear with mild granulomatous change incidentally noted on   the right. Findings were discussed with Wale Stallings at 2:35 pm on 7/23/2022. Problem List  Patient Active Problem List   Diagnosis    NSTEMI (non-ST elevated myocardial infarction) ([facility])    HTN (hypertension)    Hyperlipidemia    DM (diabetes mellitus) ([facility] [address]    Coronary artery disease involving native coronary artery of native heart with unstable angina pectoris ([facility])    Ischemic cardiomyopathy    Unstable angina ([facility])    ACS (acute coronary syndrome) ([facility])    Other fatigue    SOB (shortness of breath)    Uncontrolled type 2 diabetes mellitus with hyperglycemia ([address]    Acute saddle pulmonary embolism with acute cor pulmonale ([address]       IMPRESSION/RECOMMENDATIONS:  1.) Saddle PE  - Diagnosed with a 1st unprovoked PE by CTPA on 7/23/2022. - A duplex ultrasound of the BLE has already been ordered. Thanks. - No family history.  - Nonsmoker. BMI 27.  - Had a colonoscopy 3-5 yrs ago through the [place]. Unsure of PSA.    - Agree with UFH. Await vasc surg consult. - He would be a reasonable candidate for a NOAC when ready to transition off of UFH.  - I have his contact information and will arrange follow up in the office. Thank you for asking me to see the patient.        Pedro Pablo Ruiz MD  PleaseContact Through [employer]

## 2022-07-26 VITALS
HEIGHT: 72 IN | TEMPERATURE: 98.2 F | WEIGHT: 203.9 LBS | SYSTOLIC BLOOD PRESSURE: 92 MMHG | BODY MASS INDEX: 27.62 KG/M2 | OXYGEN SATURATION: 92 % | RESPIRATION RATE: 23 BRPM | HEART RATE: 72 BPM | DIASTOLIC BLOOD PRESSURE: 71 MMHG

## 2022-07-26 LAB
ANION GAP SERPL CALCULATED.3IONS-SCNC: 15 MMOL/L (ref 3–16)
ANION GAP SERPL CALCULATED.3IONS-SCNC: 18 MMOL/L (ref 3–16)
APTT: 30.7 SEC (ref 23–34.3)
APTT: 30.8 SEC (ref 23–34.3)
BUN BLDV-MCNC: 20 MG/DL (ref 7–20)
BUN BLDV-MCNC: 21 MG/DL (ref 7–20)
CALCIUM SERPL-MCNC: 9.1 MG/DL (ref 8.3–10.6)
CALCIUM SERPL-MCNC: 9.5 MG/DL (ref 8.3–10.6)
CHLORIDE BLD-SCNC: 100 MMOL/L (ref 99–110)
CHLORIDE BLD-SCNC: 99 MMOL/L (ref 99–110)
CO2: 18 MMOL/L (ref 21–32)
CO2: 20 MMOL/L (ref 21–32)
CREAT SERPL-MCNC: 0.9 MG/DL (ref 0.8–1.3)
CREAT SERPL-MCNC: 0.9 MG/DL (ref 0.8–1.3)
FIBRINOGEN: 476 MG/DL (ref 207–509)
FIBRINOGEN: 494 MG/DL (ref 207–509)
FIBRINOGEN: 566 MG/DL (ref 207–509)
GFR AFRICAN AMERICAN: >60
GFR AFRICAN AMERICAN: >60
GFR NON-AFRICAN AMERICAN: >60
GFR NON-AFRICAN AMERICAN: >60
GLUCOSE BLD-MCNC: 132 MG/DL (ref 70–99)
GLUCOSE BLD-MCNC: 214 MG/DL (ref 70–99)
GLUCOSE BLD-MCNC: 234 MG/DL (ref 70–99)
GLUCOSE BLD-MCNC: 273 MG/DL (ref 70–99)
HCT VFR BLD CALC: 38.4 % (ref 40.5–52.5)
HCT VFR BLD CALC: 39 % (ref 40.5–52.5)
HCT VFR BLD CALC: 42.5 % (ref 40.5–52.5)
HEMOGLOBIN: 13.1 G/DL (ref 13.5–17.5)
HEMOGLOBIN: 13.1 G/DL (ref 13.5–17.5)
HEMOGLOBIN: 14.4 G/DL (ref 13.5–17.5)
MCH RBC QN AUTO: 29.7 PG (ref 26–34)
MCH RBC QN AUTO: 29.7 PG (ref 26–34)
MCH RBC QN AUTO: 30 PG (ref 26–34)
MCHC RBC AUTO-ENTMCNC: 33.5 G/DL (ref 31–36)
MCHC RBC AUTO-ENTMCNC: 33.9 G/DL (ref 31–36)
MCHC RBC AUTO-ENTMCNC: 34.2 G/DL (ref 31–36)
MCV RBC AUTO: 87.5 FL (ref 80–100)
MCV RBC AUTO: 87.7 FL (ref 80–100)
MCV RBC AUTO: 88.7 FL (ref 80–100)
PDW BLD-RTO: 14.4 % (ref 12.4–15.4)
PERFORMED ON: ABNORMAL
PERFORMED ON: ABNORMAL
PLATELET # BLD: 136 K/UL (ref 135–450)
PLATELET # BLD: 141 K/UL (ref 135–450)
PLATELET # BLD: 153 K/UL (ref 135–450)
PMV BLD AUTO: 6 FL (ref 5–10.5)
PMV BLD AUTO: 6.2 FL (ref 5–10.5)
PMV BLD AUTO: 6.3 FL (ref 5–10.5)
POTASSIUM SERPL-SCNC: 3.9 MMOL/L (ref 3.5–5.1)
POTASSIUM SERPL-SCNC: 4.4 MMOL/L (ref 3.5–5.1)
PROSTATE SPECIFIC ANTIGEN: 2.51 NG/ML (ref 0–4)
RBC # BLD: 4.39 M/UL (ref 4.2–5.9)
RBC # BLD: 4.4 M/UL (ref 4.2–5.9)
RBC # BLD: 4.85 M/UL (ref 4.2–5.9)
SODIUM BLD-SCNC: 135 MMOL/L (ref 136–145)
SODIUM BLD-SCNC: 135 MMOL/L (ref 136–145)
WBC # BLD: 8 K/UL (ref 4–11)
WBC # BLD: 8.8 K/UL (ref 4–11)
WBC # BLD: 9.2 K/UL (ref 4–11)

## 2022-07-26 PROCEDURE — 6370000000 HC RX 637 (ALT 250 FOR IP): Performed by: SURGERY

## 2022-07-26 PROCEDURE — 99232 SBSQ HOSP IP/OBS MODERATE 35: CPT | Performed by: INTERNAL MEDICINE

## 2022-07-26 PROCEDURE — 6360000002 HC RX W HCPCS: Performed by: SURGERY

## 2022-07-26 PROCEDURE — 2580000003 HC RX 258: Performed by: SURGERY

## 2022-07-26 PROCEDURE — 85027 COMPLETE CBC AUTOMATED: CPT

## 2022-07-26 PROCEDURE — 36415 COLL VENOUS BLD VENIPUNCTURE: CPT

## 2022-07-26 PROCEDURE — C9113 INJ PANTOPRAZOLE SODIUM, VIA: HCPCS | Performed by: SURGERY

## 2022-07-26 PROCEDURE — 80048 BASIC METABOLIC PNL TOTAL CA: CPT

## 2022-07-26 PROCEDURE — 99233 SBSQ HOSP IP/OBS HIGH 50: CPT | Performed by: SURGERY

## 2022-07-26 PROCEDURE — 85730 THROMBOPLASTIN TIME PARTIAL: CPT

## 2022-07-26 PROCEDURE — 84153 ASSAY OF PSA TOTAL: CPT

## 2022-07-26 PROCEDURE — 85384 FIBRINOGEN ACTIVITY: CPT

## 2022-07-26 RX ORDER — SPIRONOLACTONE 25 MG/1
25 TABLET ORAL DAILY
Qty: 14 TABLET | Refills: 0 | Status: SHIPPED | OUTPATIENT
Start: 2022-07-27

## 2022-07-26 RX ORDER — HEPARIN SODIUM 1000 [USP'U]/ML
5000 INJECTION, SOLUTION INTRAVENOUS; SUBCUTANEOUS ONCE
Status: CANCELLED | OUTPATIENT
Start: 2022-07-26 | End: 2022-07-26

## 2022-07-26 RX ADMIN — PANTOPRAZOLE SODIUM 40 MG: 40 INJECTION, POWDER, FOR SOLUTION INTRAVENOUS at 08:46

## 2022-07-26 RX ADMIN — Medication 10 ML: at 08:51

## 2022-07-26 RX ADMIN — INSULIN LISPRO 2 UNITS: 100 INJECTION, SOLUTION INTRAVENOUS; SUBCUTANEOUS at 12:42

## 2022-07-26 RX ADMIN — APIXABAN 10 MG: 5 TABLET, FILM COATED ORAL at 08:45

## 2022-07-26 RX ADMIN — SPIRONOLACTONE 25 MG: 25 TABLET ORAL at 08:45

## 2022-07-26 RX ADMIN — LISINOPRIL 10 MG: 10 TABLET ORAL at 08:45

## 2022-07-26 RX ADMIN — ASPIRIN 81 MG: 81 TABLET, COATED ORAL at 08:45

## 2022-07-26 RX ADMIN — CARVEDILOL 12.5 MG: 3.12 TABLET, FILM COATED ORAL at 08:45

## 2022-07-26 ASSESSMENT — PAIN DESCRIPTION - LOCATION: LOCATION: HEAD

## 2022-07-26 ASSESSMENT — PAIN SCALES - GENERAL: PAINLEVEL_OUTOF10: 2

## 2022-07-26 NOTE — PROGRESS NOTES
INPATIENT PULMONARY CRITICAL CARE PROGRESS NOTE      Reason for visit    Acute B/l pulmonary embolism      SUBJECTIVE: Patient when seen this morning was feeling better, patient did not have that much of back pain, patient was not having significant cough or expectoration of concern, no increasing shortness of breath or wheezing, no chest pain or palpitations, patient was continued on heparin infusion when seen, patient was afebrile and hemodynamically maintained, patient had sinus rhythm on the monitor, patient's glycemic control was acceptable, patient was on room air oxygen with saturation of 93% when seen, patient had adequate urine output overnight, patient was alert and oriented, no bleeding per se, patient was not having any other pertinent review of system of concern         Physical Exam:  Blood pressure 136/81, pulse 93, temperature 97.1 °F (36.2 °C), temperature source Oral, resp. rate 22, height 6' (1.829 m), weight 203 lb 14.4 oz (92.5 kg), SpO2 96 %.'   Constitutional:  No acute distress. HENT:  Oropharynx is clear and moist. No thyromegaly. Large ecchymosis in the submental area along with that patient also had some crusting of the lips  Eyes:  Conjunctivae are normal. Pupils equal, round, and reactive to light. No scleral icterus. Neck: . No tracheal deviation present. No obvious thyroid mass. Cardiovascular: Normal rate, regular rhythm, normal heart sounds. No right ventricular heave. No lower extremity edema. Pulmonary/Chest: No wheezes. No rales. Chest wall is not dull to percussion. No accessory muscle usage or stridor. Abdominal: Soft. Bowel sounds present. No distension or hernia. No tenderness. Musculoskeletal: No cyanosis. No clubbing. No obvious joint deformity. Lymphadenopathy: No cervical or supraclavicular adenopathy. Skin: Skin is warm and dry. No rash or nodules on the exposed extremities. Psychiatric: Normal mood and affect. Behavior is normal.  No anxiety. CATH   Corporate ID       [id number]            Sonographer         Joanne Viveros, RT   Ordering Physician Alejandra Rowell., MD              Physician           Marianne Ryan MD  Procedure Type of Study   TTE procedure:ECHOCARDIOGRAM COMPLETE 2D W DOPPLER W COLOR. Procedure Date Date: 07/25/2022 Start: 06:37 AM Study Location: [address] [place] [address] - Echo Lab Technical Quality: Limited visualization Indications:Pulmonary embolus. Patient Status: STAT Contrast Medium: Definity. Height: 72 inches Weight: 200 pounds BSA: 2.13 m2 BMI: 27.13 kg/m2 BP: 159/84 mmHg  Conclusions   Summary  The left ventricular systolic function is mildly reduced with an ejection  fraction of 50- 55 %. Septal bounce due to right ventricular volume overload. Normal left ventricular size with mild concentric left ventricular  hypertrophy. Grade I diastolic dysfunction with normal filling pressure. Compared to previous study left ventricle systolic function appears same as  echo on 10-. The right ventricle is moderate to severely enlarged. Right ventricular systolic function is moderate to severely reduced . Mild tricuspid and pulmonic regurgitation. Systolic pulmonic artery pressure (SPAP) is estimated at 52 mmHg consistent  with moderate pulmonary hypertension (Right atrial pressure of 8 mmHg). The right atrium is moderately dilated. Signature   ------------------------------------------------------------------  Electronically signed by Marianne Ryan MD (Interpreting  physician) on 07/25/2022 at 08:56 AM  ------------------------------------------------------------------   Findings   Left Ventricle  The left ventricular systolic function is mildly reduced with an ejection  fraction of 50- 55 %. Septal bounce due to right ventricular volume overload. Normal left ventricular size with mild concentric left ventricular  hypertrophy.   Grade I diastolic dysfunction with normal filling pressure. Compared to previous study left ventricle systolic function appears same as  echo on 10-. Mitral Valve  Mitral valve is structurally normal.  No evidence of mitral regurgitation. Left Atrium  The left atrium is normal in size. Aortic Valve  Aortic valve appears sclerotic but opens adequately. No evidence of aortic valve regurgitation. Aorta  The aortic root is normal in size. Right Ventricle  The right ventricle is moderate to severely enlarged. Right ventricular systolic function is moderate to severely reduced . TAPSE is measured at 13 mm. S' prime velocity is measured at 6 cm/s. Tricuspid Valve  Tricuspid valve is structurally normal.  Mild tricuspid regurgitation. Systolic pulmonic artery pressure (SPAP) is estimated at 52 mmHg consistent  with moderate pulmonary hypertension (Right atrial pressure of 8 mmHg). Right Atrium  The right atrium is moderately dilated in size at 21 cm2. Pulmonic Valve  The pulmonic valve is not well visualized. Mild pulmonic regurgitation present. Pericardial Effusion  There is a trivial localized near right ventricle pericardial effusion  noted. No tamponade physiology. Pleural Effusion  No pleural effusion. Miscellaneous  IVC size is normal (<2.1 cm) but collapses < 50% with respiration consistent  with elevated RA pressure (8 mmHg).   M-Mode/2D Measurements (cm)   LV Diastolic Dimension: 7.80 cm LV Systolic Dimension: 0.73 cm  LV Septum Diastolic: 6.25 cm  LV PW Diastolic: 3.94 cm        AO Root Dimension: 2.9 cm                                  AV Cusp Separation: 1.9 cm                                  LA Dimension: 2.1 cm                                  LA Area: 12 cm2                                  LA volume/Index: 27 ml /13 ml/m2  Doppler Measurements   AV Peak Velocity: 109 cm/s    MV Peak E-Wave: 39.8 cm/s  AV Peak Gradient: 4.75 mmHg   MV Peak A-Wave: 60.4 cm/s                                MV E/A Ratio: 0.66   TR Velocity:335 cm/s  TR Gradient:44.89 mmHg  Estimated RAP:8 mmHg  Estimated RVSP: 52 mmHg  E' Septal Velocity: 4.13 cm/s  E' Lateral Velocity: 9.9 cm/s  E/E' ratio: 6.8  PV Peak Velocity: 105 cm/s  PV Peak Gradient: 4.41 mmHg   Aortic Valve   Peak Velocity: 109 cm/s  Peak Gradient: 4.75 mmHg   Cusp Separation: 1.9 cm  Aorta   Aortic Root: 2.9 cm      CT HEAD WO CONTRAST    Result Date: 7/23/2022  EXAMINATION: CT OF THE HEAD WITHOUT CONTRAST  7/23/2022 1:53 pm TECHNIQUE: CT of the head was performed without the administration of intravenous contrast. Automated exposure control, iterative reconstruction, and/or weight based adjustment of the mA/kV was utilized to reduce the radiation dose to as low as reasonably achievable. COMPARISON: None. HISTORY: ORDERING SYSTEM PROVIDED HISTORY: fall TECHNOLOGIST PROVIDED HISTORY: Reason for exam:->fall Has a \"code stroke\" or \"stroke alert\" been called? ->No Decision Support Exception - unselect if not a suspected or confirmed emergency medical condition->Emergency Medical Condition (MA) Reason for Exam: pt had a syncopal episode and fell,hit his face FINDINGS: BRAIN/VENTRICLES: The ventricles and sulci are prominent. Pattern is consistent with age-related atrophy. No extra-axial fluid collections, and no sign of recent intracranial hemorrhage. Decreased attenuation is noted within the periventricular white matter. Pattern is consistent with chronic small vessel ischemic change. No acute edema or mass effect. No mass lesions are detected. ORBITS: The visualized portion of the orbits demonstrate no acute abnormality. SINUSES: Mild mucosal thickening is observed in the visualized sphenoid sinuses. Paranasal sinuses are otherwise clear. SOFT TISSUES/SKULL:  No acute abnormality of the visualized skull or soft tissues. No acute intracranial abnormality.      CT FACIAL BONES WO CONTRAST    Result Date: 7/23/2022  EXAMINATION: CT OF THE FACE WITHOUT CONTRAST  7/23/2022 1:53 pm TECHNIQUE: CT of the face was performed without the administration of intravenous contrast. Multiplanar reformatted images are provided for review. Automated exposure control, iterative reconstruction, and/or weight based adjustment of the mA/kV was utilized to reduce the radiation dose to as low as reasonably achievable. COMPARISON: None HISTORY: ORDERING SYSTEM PROVIDED HISTORY: syncope TECHNOLOGIST PROVIDED HISTORY: Reason for exam:->syncope Decision Support Exception - unselect if not a suspected or confirmed emergency medical condition->Emergency Medical Condition (MA) Reason for Exam: pt fell today and landed on his face,one of his teeth are loose,lac to lips FINDINGS: FACIAL BONES: The frontal sinuses, orbital walls, maxilla, pterygoid plates, zygomatic arches, hard palate, nasal bones and mandible are intact. The temporomandibular joints are aligned. ORBITAL CONTENTS: The globes appear intact. The extraocular muscles, optic nerve sheath complexes and lacrimal glands appear unremarkable. No retrobulbar hematoma or mass is seen. SINUSES: Mild mucosal thickening in the sphenoid sinuses. There is also some mucosal changes in the posterior ethmoid air cells. Paranasal sinuses are otherwise clear. SOFT TISSUES: No soft tissue contusion is appreciated of the face. No acute facial bone trauma.      C  VL Extremity Venous Bilateral    Result Date: 7/25/2022  Vascular Lower Extremities DVT Study Procedure -- PRELIMINARY SONOGRAPHER REPORT --   Demographics   Patient Name       Tray Beasley   Date of Study      07/25/2022         Gender              Male   Patient Number     [id number]         Date of Birth       1955   Visit Number       [id number]          Age                 69 year(s)   Accession Number   [id number]         Room Number         0221   Corporate ID       [id number]           Sonographer         Kaylee Vu A,                                                            MELINA, RDCS, Ordering Physician [facility]                     MD                 Physician           Readers  Procedure Type of Study:   Veins:Lower Extremities DVT Study, VASC EXTREMITY VENOUS DUPLEX BILATERAL. Tech Comments Right Limited exam due to recent Cath lab procedure. Multiple dressings and lines present at groin and proximal thigh area. There is acute deep vein thrombosis is seen involving the femoral, popliteal and gastrocnemius veins. Left Acute deep vein thrombosis involving the left popliteal vein. No other evidence of deep or superficial venous thrombosis seen involving the lower extremity. Assessment:  Principal Problem:    Acute saddle pulmonary embolism with acute cor pulmonale (HCC)  Active Problems:    Uncontrolled type 2 diabetes mellitus with hyperglycemia (HCC)    Acute deep vein thrombosis (DVT) of femoral vein of right lower extremity (HCC)    Pulmonary HTN (HCC)    Normal anion gap metabolic acidosis    Enlarged RV (right ventricle)    Moderate right ventricular systolic dysfunction    HTN (hypertension)    Hyperlipidemia    Coronary artery disease involving native coronary artery of native heart with unstable angina pectoris ([facility] [address]  Resolved Problems:    * No resolved hospital problems.  *          Plan:   Oxygen supplementation to keep saturation between 90 and 94%  Please titrate the oxygen as per the above parameters  Status post EKOS  Heparin infusion as per vascular surgery which is being changed to a NOAC  Patient's venous Doppler results were also discussed with patient and family  Patient has unprovoked pulmonary embolism and DVT with no family history of hypercoagulable state  Patient does not have any central tumor in the chest of concern  Patient's PSA levels were normal  Patient continues to have metabolic acidemia with slightly elevated anion gap and evaluation and management as per IM   Off IV fluids   Monitor for any bleeding  Insulins as per IM-glycemic control is acceptable   Monitor for any hypoglycemia  Echocardiac results were reviewed and discussed with patient and family along with implications   May need Rpt Echocardiogram in 6-8 weeks -PCP to decide   Monitoring input output and BMP  Correct electrolytes on whenever necessary basis  PUD prophylaxis    Case discussed with ICU team    ? Discharge planning     No other interventions from pulmonary/critical care standpoint of view- will sign off-please call on whenever necessary basis if the patient is not discharged       Electronically signed by:  Gilma Atowod MD    7/26/2022    9:40 AM.

## 2022-07-26 NOTE — PLAN OF CARE
Patient is discharged home. Problem: Discharge Planning  Goal: Discharge to home or other facility with appropriate resources  7/26/2022 1406 by Mercedez Villar RN  Outcome: Resolved/Met  7/26/2022 1255 by Mercedez Villar RN  Outcome: Progressing Towards Goal     Problem: Pain  Goal: Verbalizes/displays adequate comfort level or baseline comfort level  7/26/2022 1406 by Mercedez Villar RN  Outcome: Resolved/Met  7/26/2022 1255 by Mercedez Villar RN  Outcome: Progressing Towards Goal     Problem: Safety - Adult  Goal: Free from fall injury  7/26/2022 1406 by Mercedez Villar RN  Outcome: Resolved/Met  7/26/2022 1255 by Mercedez Villar RN  Outcome: Progressing Towards Goal     Problem: ABCDS Injury Assessment  Goal: Absence of physical injury  7/26/2022 1406 by Mercedez Villar RN  Outcome: Resolved/Met  7/26/2022 1255 by Mercedez Villar RN  Outcome: Progressing Towards Goal     Problem: Skin/Tissue Integrity  Goal: Absence of new skin breakdown  Description: 1. Monitor for areas of redness and/or skin breakdown  2. Assess vascular access sites hourly  3. Every 4-6 hours minimum:  Change oxygen saturation probe site  4. Every 4-6 hours:  If on nasal continuous positive airway pressure, respiratory therapy assess nares and determine need for appliance change or resting period.   7/26/2022 1406 by Mercedez Villar RN  Outcome: Resolved/Met  7/26/2022 1255 by Mercedez Villar RN  Outcome: Progressing Towards Goal     Problem: Chronic Conditions and Co-morbidities  Goal: Patient's chronic conditions and co-morbidity symptoms are monitored and maintained or improved  7/26/2022 1406 by Mercedez Villar RN  Outcome: Resolved/Met  7/26/2022 1255 by Mercedez Villar RN  Outcome: Progressing Towards Goal

## 2022-07-26 NOTE — PROGRESS NOTES
Vascular Surgery Progress Note      SUBJECTIVE:  POD#1 Pulmonary angiogram and EKOS. Sitting up in chair, now off supplemental O2. Feels better. Still gets a little winded with walking. OBJECTIVE  Physical  CURRENT VITALS:  BP (!) 132/103   Pulse (!) 101   Temp 97.3 °F (36.3 °C) (Axillary)   Resp 30   Ht 6' (1.829 m)   Wt 203 lb 14.4 oz (92.5 kg)   SpO2 93%   BMI 27.65 kg/m²   24 HR INTAKE/OUTPUT:    Intake/Output Summary (Last 24 hours) at 7/26/2022 0728  Last data filed at 7/26/2022 0701  Gross per 24 hour   Intake 1615 ml   Output 1625 ml   Net -10 ml     General:  resting comfortably in chair, on RA. Lungs:  CTA B  Heart:  NSR on monitor. Right groin access site soft; no hematoma or bruising. Data  CBC:   Lab Results   Component Value Date/Time    WBC 9.2 07/26/2022 04:18 AM    RBC 4.85 07/26/2022 04:18 AM    HGB 14.4 07/26/2022 04:18 AM    HCT 42.5 07/26/2022 04:18 AM    MCV 87.7 07/26/2022 04:18 AM    MCH 29.7 07/26/2022 04:18 AM    MCHC 33.9 07/26/2022 04:18 AM    RDW 14.4 07/26/2022 04:18 AM     07/26/2022 04:18 AM    MPV 6.0 07/26/2022 04:18 AM     BMP:    Lab Results   Component Value Date/Time     07/26/2022 12:16 AM    K 4.4 07/26/2022 12:16 AM    K 4.3 07/24/2022 03:12 AM    CL 99 07/26/2022 12:16 AM    CO2 18 07/26/2022 12:16 AM    BUN 21 07/26/2022 12:16 AM    LABALBU 3.8 07/24/2022 03:12 AM    CREATININE 0.9 07/26/2022 12:16 AM    CALCIUM 9.5 07/26/2022 12:16 AM    GFRAA >60 07/26/2022 12:16 AM    LABGLOM >60 07/26/2022 12:16 AM    GLUCOSE 214 07/26/2022 12:16 AM       ASSESSMENT :  Stable s/p pulmonary EKOS for B pulmonary embolus     PLAN:  Start Eliquis today--will follow with PCP. He has already scheduled upcoming appt later this week. OK to d/c from vascular perspective when cleared from medical standpoint.

## 2022-07-26 NOTE — DISCHARGE SUMMARY
Hospital Medicine Discharge Summary    Patient ID: Jane Avila      Patient's PCP: Stella Calix    Admit Date: 7/23/2022     Discharge Date: 7/26/2022      Admitting Provider: Modesta Stuart MD     Discharge Provider: Tonny Castellanos MD     Discharge Diagnoses: Active Hospital Problems    Diagnosis     Acute deep vein thrombosis (DVT) of femoral vein of right lower extremity (HCC) [I82.411]      Priority: Medium    Pulmonary HTN (HCC) [I27.20]      Priority: Medium    Normal anion gap metabolic acidosis [R92.7]      Priority: Medium    Enlarged RV (right ventricle) [I51.7]      Priority: Medium    Moderate right ventricular systolic dysfunction [R98.36]      Priority: Medium    Uncontrolled type 2 diabetes mellitus with hyperglycemia (HCC) [E11.65]      Priority: Medium    Acute saddle pulmonary embolism with acute cor pulmonale (HCC) [I26.02]      Priority: Medium    Coronary artery disease involving native coronary artery of native heart with unstable angina pectoris ([facility] [address] [I25.110]     HTN (hypertension) [I10]     Hyperlipidemia [E78.5]        The patient was seen and examined on day of discharge and this discharge summary is in conjunction with any daily progress note from day of discharge.     Hospital Course:  Acute bilateral PE with RV strain  -IV heparin -pharmacy to dose  -ECHO scheduled to further assess RV heart strain suggested on CT scan  -BLE venous duplex scheduled to identify initial source of clot burden   -vascular sx input appreciated  S/p Bilateral pulmonary arteriogram; placement of lysis catheters and initiation of thrombolysis- 7/25  Intensivist following   - started on eliquis  - ok for dc per specialities   cp / rpt echo / hematology         CAD s/p PCI with LUI  - home medication dosage of Coreg, lisinopril, Aldactone, and EC ASA; patient reports he discontinued his statin sometime ago as he felt it was unnecessary  Given elevated trop , cardio consulted  Possibly 2/2 PE  Cardio input appreciated , and signed off     Uncontrolled type II DM  -Patient endorses frequent POC glucose ranging 200s-300s despite multiple oral hypoglycemic agents; A1c pending  -All oral hypoglycemics placed on temporary hold during hospital stay  -Patient initiated on weight-based Lantus nightly  -Medium dose Humalog SSI to be administered before meals and at bedtime based on POC glucose level  -Carb restriction added to diet     Elevated LFT- trending down             Physical Exam Performed:     BP 92/71   Pulse 72   Temp 98.2 °F (36.8 °C) (Oral)   Resp 23   Ht 6' (1.829 m)   Wt 203 lb 14.4 oz (92.5 kg)   SpO2 92%   BMI 27.65 kg/m²       General appearance:  No apparent distress, appears stated age and cooperative. HEENT:  Normal cephalic, atraumatic without obvious deformity. Extra ocular muscles intact. Conjunctivae/corneas clear. Neck: Supple, with full range of motion. No jugular venous distention. Trachea midline. Respiratory:  Normal respiratory effort. Clear to auscultation, bilaterally without Rales/Wheezes/Rhonchi. Cardiovascular:  Regular rate and rhythm with normal S1/S2 without murmurs, rubs or gallops. Abdomen: Soft, non-tender, non-distended with normal bowel sounds. Musculoskeletal:  No clubbing, cyanosis or edema bilaterally. Full range of motion without deformity. Skin: Skin color, texture, turgor normal.  No rashes or lesions. Neurologic:  Neurovascularly intact without any focal sensory/motor deficits. Psychiatric:  Alert and oriented, thought content appropriate, normal insight  Capillary Refill: Brisk,< 3 seconds   Peripheral Pulses: +2 palpable, equal bilaterally       Labs:  For convenience and continuity at follow-up the following most recent labs are provided:      CBC:    Lab Results   Component Value Date/Time    WBC 8.0 07/26/2022 12:27 PM    HGB 13.1 07/26/2022 12:27 PM    HCT 39.0 07/26/2022 12:27 PM     07/26/2022 12:27 PM       Renal:    Lab mouth in the morning and 1 tablet before bedtime. , Disp-60 tablet, R-0Normal       !! - Potential duplicate medications found. Please discuss with provider. Details   famotidine (PEPCID) 20 MG tablet Take 20 mg by mouth in the morning. Historical Med      empagliflozin (JARDIANCE) 25 MG tablet Take 25 mg by mouth in the morning. Historical Med      ondansetron (ZOFRAN ODT) 4 MG disintegrating tablet Take 1 tablet by mouth every 8 hours as needed for Nausea, Disp-20 tablet, R-0Normal      nitroGLYCERIN (NITROSTAT) 0.4 MG SL tablet Place 1 tablet under the tongue every 5 minutes as needed for Chest pain up to max of 3 total doses. If no relief after 1 dose, call 911., Disp-25 tablet, R-3Normal      alogliptin (NESINA) 25 MG TABS tablet Take 25 mg by mouth dailyHistorical Med      gabapentin (NEURONTIN) 600 MG tablet Take 600 mg by mouth 2 times daily. Historical Med      atorvastatin (LIPITOR) 80 MG tablet Take 1 tablet by mouth nightly, Disp-30 tablet, R-3Normal      lisinopril (PRINIVIL;ZESTRIL) 5 MG tablet Take 1 tablet by mouth daily, Disp-30 tablet, R-3Normal      carvedilol (COREG) 6.25 MG tablet Take 1 tablet by mouth 2 times daily (with meals), Disp-60 tablet, R-3Normal      aspirin 81 MG chewable tablet Take 1 tablet by mouth daily, Disp-30 tablet, R-3Normal      glipiZIDE (GLUCOTROL) 10 MG tablet Take 5 mg by mouth 2 times daily (before meals)Historical Med      metFORMIN (GLUCOPHAGE) 1000 MG tablet Take 1,000 mg by mouth Daily with lunchHistorical Med      tamsulosin (FLOMAX) 0.4 MG capsule Take 0.4 mg by mouth dailyHistorical Med             Time Spent on discharge is  45 minutes in the examination, evaluation, counseling and review of medications and discharge plan. Signed:    Ava Patel MD   7/27/2022      Thank you Kaylee Negron for the opportunity to be involved in this patient's care. If you have any questions or concerns, please feel free to contact me at [phone].

## 2022-07-26 NOTE — PROGRESS NOTES
Shift: [id number]    Admitting diagnosis: saddle PE w/+ heart strain    Presentation to hospital: EMS after syncopal episode @ home accompanied with SOB (fell face down on hardwood floors)    Surgery: yes - 7/25 EKOS     Nursing assessment at handoff  stable    Emergency Contact/POA:wife Annie  Family updated: yes - 7/25-present    Most recent vitals: BP (!) 142/82   Pulse 77   Temp 97.3 °F (36.3 °C) (Axillary)   Resp 25   Ht 6' (1.829 m)   Wt 203 lb 14.4 oz (92.5 kg)   SpO2 93%   BMI 27.65 kg/m²      Rhythm: Normal Sinus Rhythm 70's     NC/HFNC- RA  Respiratory support: - No ventilator support    Vent days: Day NA    Increased O2 requirements: no    Admission weight Weight: 200 lb (90.7 kg)  Today's weight   Wt Readings from Last 1 Encounters:   07/26/22 203 lb 14.4 oz (92.5 kg)         UOP >30ml/hr: no (voids)    Burroughs need assessed each shift: NA    Restraints: NA     Lines/Drains  LDA Insertion Date Discontinued Date Dressing Changes   PIV  7/24 x2     TLC       Arterial       Burroughs       Vas Cath      ETT       Surgical drains        Night Shift Hospitalist Interventions    Problem(Brief) Date Time Intervention Physician contacted                                               Drip rates at handoff:    alteplase (ACTIVASE) 10 mg in 0.9% sodium chloride infusion 100 mL 0.5 mg/hr (07/25/22 0911)    And    alteplase (ACTIVASE) 10 mg in 0.9% sodium chloride infusion 100 mL 0.5 mg/hr (07/25/22 0908)    heparin (PORCINE) Infusion 250 Units/hr (07/25/22 0912)    And    heparin (PORCINE) Infusion 250 Units/hr (07/25/22 0910)    sodium chloride 35 mL/hr at 07/25/22 1012    And    sodium chloride 35 mL/hr at 07/25/22 1012    sodium chloride      sodium chloride      dextrose      sodium chloride         Hospital Course Daily Updates:  Admit Day# 2  To ICU room 221 after EKOS started via fem venous sheaths x2 rt groin; venous duplex done    7/25 PM: no major events over night. VSS.      Lab Data:   CBC:   Recent Labs

## 2022-07-26 NOTE — PLAN OF CARE
Problem: Discharge Planning  Goal: Discharge to home or other facility with appropriate resources  Outcome: Progressing Towards Goal     Problem: Pain  Goal: Verbalizes/displays adequate comfort level or baseline comfort level  Outcome: Progressing Towards Goal     Problem: Safety - Adult  Goal: Free from fall injury  Outcome: Progressing Towards Goal     Problem: ABCDS Injury Assessment  Goal: Absence of physical injury  Outcome: Progressing Towards Goal     Problem: Skin/Tissue Integrity  Goal: Absence of new skin breakdown  Description: 1. Monitor for areas of redness and/or skin breakdown  2. Assess vascular access sites hourly  3. Every 4-6 hours minimum:  Change oxygen saturation probe site  4. Every 4-6 hours:  If on nasal continuous positive airway pressure, respiratory therapy assess nares and determine need for appliance change or resting period.   Outcome: Progressing Towards Goal     Problem: Chronic Conditions and Co-morbidities  Goal: Patient's chronic conditions and co-morbidity symptoms are monitored and maintained or improved  Outcome: Progressing Towards Goal

## 2022-07-26 NOTE — PROGRESS NOTES
Hospitalist Progress Note      PCP: Justin Wray    Date of Admission: 7/23/2022    Chief Complaint: LOC    Hospital Course: reviewed     Subjective:  s/pBilateral pulmonary arteriogram; placement of lysis catheters and initiation of thrombolysis 7/25   In the ICU  No chest pain , sob , leg pain or confusion   Sob + , off o2   Ambulating        Medications:  Reviewed    Infusion Medications    sodium chloride      dextrose      sodium chloride       Scheduled Medications    apixaban  10 mg Oral BID    Followed by    Clay Radford ON 8/2/2022] apixaban  5 mg Oral BID    mupirocin   Nasal BID    sodium chloride flush  5-40 mL IntraVENous 2 times per day    carvedilol  12.5 mg Oral BID WC    aspirin  81 mg Oral Daily    atorvastatin  80 mg Oral Nightly    gabapentin  600 mg Oral Nightly    lisinopril  10 mg Oral Daily    spironolactone  25 mg Oral Daily    tamsulosin  0.4 mg Oral Nightly    insulin glargine  0.25 Units/kg SubCUTAneous Nightly    insulin lispro  0-8 Units SubCUTAneous TID WC    insulin lispro  0-4 Units SubCUTAneous Nightly    pantoprazole  40 mg IntraVENous Daily     PRN Meds: sodium chloride flush, sodium chloride, ondansetron **OR** ondansetron, perflutren lipid microspheres, LORazepam, glucose, dextrose bolus **OR** dextrose bolus, glucagon (rDNA), dextrose, sodium chloride, potassium chloride **OR** potassium alternative oral replacement **OR** potassium chloride, magnesium sulfate, senna, acetaminophen **OR** acetaminophen      Intake/Output Summary (Last 24 hours) at 7/26/2022 0928  Last data filed at 7/26/2022 0701  Gross per 24 hour   Intake 1615 ml   Output 1625 ml   Net -10 ml         Physical Exam Performed:    /81   Pulse 93   Temp 97.1 °F (36.2 °C) (Oral)   Resp 22   Ht 6' (1.829 m)   Wt 203 lb 14.4 oz (92.5 kg)   SpO2 96%   BMI 27.65 kg/m²     General appearance: No apparent distress, appears stated age and cooperative.   HEENT: . Conjunctivae/corneas clear.  ,   Neck: Supple, with full range of motion. No jugular venous distention. Trachea midline. Respiratory:  Normal respiratory effort. Clear to auscultation, bilaterally without Rales/Wheezes/Rhonchi. Off o2   Cardiovascular: Regular rate and rhythm with normal S1/S2 without murmurs, rubs or gallops. Abdomen: Soft, non-tender, non-distended with normal bowel sounds. Musculoskeletal: No clubbing, cyanosis or edema bilaterally. Full range of motion without deformity. Skin: Skin color, texture, turgor normal.   Bruise over the chin . Neurologic:  Neurovascularly intact without any focal sensory/motor deficits. Cranial Psychiatric: Alert and oriented, thought content appropriate, normal insight  Capillary Refill: Brisk,3 seconds, normal   Peripheral Pulses: +2 palpable, equal bilaterally       Labs:   Recent Labs     07/25/22  1834 07/26/22  0016 07/26/22  0418   WBC 9.5 8.8 9.2   HGB 13.4* 13.1* 14.4   HCT 39.6* 38.4* 42.5    141 153       Recent Labs     07/24/22  0312 07/25/22  1230 07/26/22  0016   * 137 135*   K 4.3 4.1 4.4    105 99   CO2 20* 19* 18*   BUN 21* 21* 21*   CREATININE 0.9 0.8 0.9   CALCIUM 9.4 9.2 9.5       Recent Labs     07/23/22  1151 07/24/22  0312   * 65*   * 89*   BILITOT 1.5* 1.1*   ALKPHOS 123 98       Recent Labs     07/24/22  1304   INR 1.10       Recent Labs     07/23/22  1151   TROPONINI 0.19*         Urinalysis:      Lab Results   Component Value Date/Time    NITRU Negative 08/29/2021 09:40 PM    BLOODU Negative 08/29/2021 09:40 PM    SPECGRAV 1.010 08/29/2021 09:40 PM    GLUCOSEU >=1000 08/29/2021 09:40 PM       Radiology:  VL Extremity Venous Bilateral         CT FACIAL BONES WO CONTRAST   Final Result   No acute facial bone trauma. CT CERVICAL SPINE WO CONTRAST   Final Result      No evidence of fracture with multilevel degenerative changes as described. CT HEAD WO CONTRAST   Final Result   No acute intracranial abnormality.          CT CHEST PULMONARY EMBOLISM W CONTRAST   Final Result   1. Acute bilateral pulmonary embolism. Additional evidence of right   ventricular strain. 2. The lungs are clear with mild granulomatous change incidentally noted on   the right. Findings were discussed with Filemon Mock at 2:35 pm on 7/23/2022. XR CHEST PORTABLE   Preliminary Result   No acute cardiopulmonary findings.                  Assessment/Plan:    Active Hospital Problems    Diagnosis     Acute deep vein thrombosis (DVT) of femoral vein of right lower extremity ([facility]) [I82.411]      Priority: Medium    Pulmonary HTN ([facility]) [I27.20]      Priority: Medium    Normal anion gap metabolic acidosis [A59.9]      Priority: Medium    Enlarged RV (right ventricle) [I51.7]      Priority: Medium    Moderate right ventricular systolic dysfunction [C18.15]      Priority: Medium    Uncontrolled type 2 diabetes mellitus with hyperglycemia ([facility]) [E11.65]      Priority: Medium    Acute saddle pulmonary embolism with acute cor pulmonale ([facility]) [I26.02]      Priority: Medium    Coronary artery disease involving native coronary artery of native heart with unstable angina pectoris ([facility] [address] [I25.110]     HTN (hypertension) [I10]     Hyperlipidemia [E78.5]          Acute bilateral PE with RV strain  -IV heparin -pharmacy to dose   -ECHO scheduled to further assess RV heart strain suggested on CT scan  -BLE venous duplex scheduled to identify initial source of clot burden   -vascular sx input appreciated   S/p Bilateral pulmonary arteriogram; placement of lysis catheters and initiation of thrombolysis- 7/25   Intensivist following   - started on eliquis   - ok for dc per specialities        CAD s/p PCI with LUI  - home medication dosage of Coreg, lisinopril, Aldactone, and EC ASA; patient reports he discontinued his statin sometime ago as he felt it was unnecessary  Given elevated trop , cardio consulted   Possibly 2/2 PE  Cardio input appreciated , and signed off      Uncontrolled type II DM  -Patient endorses frequent POC glucose ranging 200s-300s despite multiple oral hypoglycemic agents; A1c pending  -All oral hypoglycemics placed on temporary hold during hospital stay  -Patient initiated on weight-based Lantus nightly  -Medium dose Humalog SSI to be administered before meals and at bedtime based on POC glucose level  -Carb restriction added to diet    Elevated LFT- trending down        DVT prophylaxis-high-dose weight-based heparin drip to treat PE  Diet: ADULT DIET;  Regular; 4 carb choices (60 gm/meal)  Code Status: Full Code    PT/OT Eval Status:  ambulatory     Dispo - dc home today     Patsy Hess MD

## 2022-07-26 NOTE — DISCHARGE INSTRUCTIONS
F/u PCP , may need rpt ECHO  F/u hematology     YOU ARE BEING DISCHARGED HOME. PLEASE MAKE AND KEEP YOUR FOLLOW UP APPOINTMENTS. IF YOU EXPERIENCE SHORTNESS OF BREATH, CHEST PAIN, PALPITATIONS, OR LOSS OF CONSCIOUSNESS, PLEASE COME BACK TO THE EMERGENCY DEPARTMENT. IF YOU ARE GENERALLY FEELING UNWELL, PLEASE CONTACT YOUR PHYSICIAN.

## 2022-07-26 NOTE — PROGRESS NOTES
RN to document for home O2    Oxygen documentation:  O2 saturation at REST on ROOM AIR = 97 %    If saturation is 89% or above please proceed with steps 2 and 3.   O2 saturation with AMBULATION of 60 feet on ROOM AIR =  94%  O2 saturation with AMBULATION on 1L NC =  %

## 2022-07-27 LAB
BLOOD CULTURE, ROUTINE: NORMAL
CULTURE, BLOOD 2: NORMAL

## 2022-08-01 NOTE — OP NOTE
Operative Note      Patient: Reginaldo Dey  YOB: 1955  MRN: [id number]     Date of Procedure: 7/25/2022     Pre-Op Diagnosis: Saddle Pulmonary Embolism with Right heart strain     Post-Op Diagnosis: Same       Procedure:  Bilateral pulmonary arteriogram; placement of lysis catheters and initiation of thrombolysis     Surgeon:  Tamica Winkler MD      Estimated Blood Loss (mL): Minimal     Complications: None     Specimens:   * Cannot find log *     Implants:  * No surgical log found *      Drains: * No LDAs found *     Findings: Successful placement of thrombolytic catheters (106cm x 6cm EKOS) in bilateral proximal pulmonary arteries. Detailed Description of Procedure: The patient was taken to the hybrid operating room, placed in spine position prepped and draped in usual sterile fashion using Betadine solution. Timeout was called patient operative site were properly identified. He was given IV antibiotics prior to the procedure. The right common femoral vein was accessed percutaneously under ultrasound guidance and a 6 Belgian sheath was placed over a Bentson wire. Just proximal to the first access, the right common femoral vein was reaccessed with a second sheath, and a 6 Belgian sheath was again placed over a Bentson wire. Through the more proximal 6 Belgian sheath, a pigtail catheter was inserted and guided up into the proximal main pulmonary arteries and pulmonary arteriogram was performed. The catheter was removed and exchanged for a Kumpe catheter which was used to selectively catheterize the right main pulmonary artery. Because the preoperative imaging identified the bulk of the thrombus in the lower lobe, selective imaging of the main pulmonary artery was performed and the catheter was guided into the lower arterial branch. A Bentson wire was then inserted. Because of the acuity of the angle, this would not allow passage of the lysis catheter.   Therefore the [address]